# Patient Record
Sex: MALE | Race: BLACK OR AFRICAN AMERICAN | NOT HISPANIC OR LATINO | URBAN - METROPOLITAN AREA
[De-identification: names, ages, dates, MRNs, and addresses within clinical notes are randomized per-mention and may not be internally consistent; named-entity substitution may affect disease eponyms.]

---

## 2023-08-23 ENCOUNTER — HOSPITAL ENCOUNTER (EMERGENCY)
Facility: HOSPITAL | Age: 58
Discharge: HOME | End: 2023-08-23
Attending: STUDENT IN AN ORGANIZED HEALTH CARE EDUCATION/TRAINING PROGRAM
Payer: COMMERCIAL

## 2023-08-23 VITALS
HEART RATE: 80 BPM | OXYGEN SATURATION: 99 % | RESPIRATION RATE: 16 BRPM | DIASTOLIC BLOOD PRESSURE: 87 MMHG | SYSTOLIC BLOOD PRESSURE: 146 MMHG | TEMPERATURE: 99 F | HEIGHT: 70 IN | BODY MASS INDEX: 32.93 KG/M2 | WEIGHT: 230 LBS

## 2023-08-23 DIAGNOSIS — R09.89 CHEST CONGESTION: ICD-10-CM

## 2023-08-23 LAB
CTP QC/QA: YES
INFLUENZA A ANTIGEN, POC: NEGATIVE
INFLUENZA B ANTIGEN, POC: NEGATIVE
SARS-COV-2 RDRP RESP QL NAA+PROBE: NEGATIVE

## 2023-08-23 PROCEDURE — 99283 EMERGENCY DEPT VISIT LOW MDM: CPT | Mod: 25,ER

## 2023-08-23 PROCEDURE — 87635 SARS-COV-2 COVID-19 AMP PRB: CPT | Mod: ER | Performed by: NURSE PRACTITIONER

## 2023-08-23 PROCEDURE — 87804 INFLUENZA ASSAY W/OPTIC: CPT | Mod: ER

## 2023-08-23 RX ORDER — AZITHROMYCIN 250 MG/1
250 TABLET, FILM COATED ORAL DAILY
Qty: 6 TABLET | Refills: 0 | Status: SHIPPED | OUTPATIENT
Start: 2023-08-23 | End: 2023-09-07

## 2023-08-23 NOTE — ED NOTES
Patient presents to ED reports congestion, headache, chills, nasal drainage, productive cough. Patient states symptoms began Monday. Patient states was sent by work to get COVID test. Patient reports taking Robitussin with some relief. Patient denies sick contacts.

## 2023-08-23 NOTE — DISCHARGE INSTRUCTIONS
Thank you for coming to our Emergency Department today. It is important to remember that some problems or medical conditions are difficult to diagnose and may not be found during your Emergency Department visit.     Be sure to follow up with your primary care doctor and review all labs/imaging/tests that were performed during your ER visit with them. Some labs/tests may be outside of the normal range and require non-emergent follow-up and further investigation to help diagnose/exclude/prevent complications or other potentially serious medical conditions that were not addressed during your ER visit.    If you do not have a primary care doctor, you may contact the one listed on your discharge paperwork or you may also call the [facility] Appointment Desk at [phone] to schedule an appointment and establish care with one. It is important to your health that you have a primary care doctor.    Please take all medications as directed. All medications may potentially have side-effects and it is impossible to predict which medications may give you side-effects or what side-effects (if any) they will give you.. If you feel that you are having a negative effect or side-effect of any medication you should immediately stop taking them and seek medical attention. If you feel that you are having a life-threatening reaction call 911.    Return to the ER with any questions/concerns, new/concerning symptoms, worsening or failure to improve.     Do not drive, swim, climb to height, take a bath, operate heavy machinery, drink alcohol or take potentially sedating medications, sign any legal documents or make any important decisions for 24 hours if you have received any pain medications, sedatives or mood altering drugs during your ER visit or within 24 hours of taking them if they have been prescribed to you.     You can find additional resources for Dentists, hearing aids, durable medical equipment, low cost pharmacies and  other resources at https://[facility].org    BELOW THIS LINE ONLY APPLIES IF YOU HAVE A COVID TEST PENDING OR IF YOU HAVE BEEN DIAGNOSED WITH COVID:  Please access [facility] to review the results of your test. Until the results of your COVID test return, you should isolate yourself so as not to potentially spread illness to others.   If your COVID test returns positive, you should isolate yourself so as not to spread illness to others. After five full days, if you are feeling better and you have not had fever for 24 hours, you can return to your typical daily activities, but you must wear a mask around others for an additional 5 days.   If your COVID test returns negative and you are either unvaccinated or more than six months out from your two-dose vaccine and are not yet boosted, you should still quarantine for 5 full days followed by strict mask use for an additional 5 full days.   If your COVID test returns negative and you have received your 2-dose initial vaccine as well as a booster, you should continue strict mask use for 10 full days after the exposure.  For all those exposed, best practice includes a test at day 5 after the exposure. This can be a home test or a test through one of the many testing centers throughout our community.   Masking is always advised to limit the spread of COVID. Cdc.gov is an excellent site to obtain the latest up to date recommendations regarding COVID and COVID testing.     CDC Testing and Quarantine Guidelines for patients with exposure to a known-positive COVID-19 person:  A close exposure is defined as anyone who has had an exposure (masked or unmasked) to a known COVID -19 positive person within 6 feet of someone for a cumulative total of 15 minutes or more over a 24-hour period.   Vaccinated and/or if you recently had a positive covid test within 90 days do NOT need to quarantine after contact with someone who had COVID-19 unless you develop symptoms.   Fully vaccinated  people who have not had a positive test within 90 days, should get tested 3-5 days after their exposure, even if they don't have symptoms and wear a mask indoors in public for 14 days following exposure or until their test result is negative.      Unvaccinated and/or NOT had a positive test within 90 days and meet close exposure  You are required by CDC guidelines to quarantine for at least 5 days from time of exposure followed by 5 days of strict masking. It is recommended, but not required to test after 5 days, unless you develop symptoms, in which case you should test at that time.  If you get tested after 5 days and your test is positive, your 5 day period of isolation starts the day of the positive test.    If your exposure does not meet the above definition, you can return to your normal daily activities to include social distancing, wearing a mask and frequent handwashing.      Here is a link to guidance from the CDC:  https://www.cdc.gov/media/releases/2021/s1227-isolation-quarantine-guidance.html      [place] Dept Of Health Testing Sites:  https://ldh.la.gov/page/3934      [facility] website with testing locations and guidance:  https://www.[facility].org/selfcare

## 2023-08-23 NOTE — Clinical Note
"Ryan"Jesi Rico was seen and treated in our emergency department on 8/23/2023.  He may return to work on 08/28/2023.       If you have any questions or concerns, please don't hesitate to call.      Dayan Razo, DO"

## 2023-08-23 NOTE — ED PROVIDER NOTES
Encounter Date: 8/23/2023    SCRIBE #1 NOTE: I, Devora Vega, am scribing for, and in the presence of,  Dayan Razo DO. I have scribed the following portions of the note - Other sections scribed: HPI; ROS; PE.       History     Chief Complaint   Patient presents with    URI     Pt presents to the ED with headache, cough, runny nose, and congestion onset today.     Ryan Rico is a 58 y.o. male with no known medical Hx who presents to the ED for chief complaint of headache, subjective fever, and chills onset 3 days ago. Associated symptoms are rhinorrhea, cough, congestion, and light-headedness. Patient attempted treatment with Robitussin with no immediate relief. He notes no known sick contact. Patient denies associated chest pain, shortness of breath, nausea, vomiting, diarrhea, dysuria, hematuria, myalgias, otalgia, sore throat, abdominal pain, or rash. No further complaints at this time. Patient notes no loss of consciousness. Patient does not have any medical allergies.       The history is provided by the patient. No  was used.     Review of patient's allergies indicates:  No Known Allergies  No past medical history on file.  No past surgical history on file.  No family history on file.     Review of Systems   Constitutional:  Positive for chills and fatigue. Negative for fever.   HENT:  Positive for congestion and rhinorrhea. Negative for drooling, ear pain, facial swelling, sore throat, trouble swallowing and voice change.    Eyes:  Negative for redness and visual disturbance.   Respiratory:  Positive for cough. Negative for shortness of breath and stridor.    Cardiovascular:  Negative for chest pain.   Gastrointestinal:  Negative for abdominal pain, nausea and vomiting.   Genitourinary:  Negative for dysuria and hematuria.   Musculoskeletal:  Negative for gait problem, myalgias and neck stiffness.   Skin:  Negative for pallor, rash and wound.   Neurological:  Positive for  light-headedness and headaches. Negative for syncope, facial asymmetry, speech difficulty and weakness.   Psychiatric/Behavioral:  Negative for confusion.    All other systems reviewed and are negative.      Physical Exam     Initial Vitals [08/23/23 1057]   BP Pulse Resp Temp SpO2   139/89 88 18 99.1 °F (37.3 °C) 98 %      MAP       --         Physical Exam    Nursing note and vitals reviewed.  Constitutional: Vital signs are normal. He appears well-developed and well-nourished. He is not diaphoretic.  Non-toxic appearance. He does not have a sickly appearance. He does not appear ill.   HENT:   Head: Normocephalic and atraumatic.   Right Ear: External ear and ear canal normal.   Left Ear: External ear and ear canal normal.   Nose: Nose normal.   Mouth/Throat: Uvula is midline, oropharynx is clear and moist and mucous membranes are normal. No oropharyngeal exudate.   Unable to visualize bilateral TM's secondary to cerumen.    Eyes: Conjunctivae and EOM are normal. Pupils are equal, round, and reactive to light. No scleral icterus.   Neck: Neck supple. No JVD present.   Normal range of motion.  Cardiovascular:  Normal rate, regular rhythm, normal heart sounds and intact distal pulses.           Pulmonary/Chest: No accessory muscle usage. No respiratory distress. He has no decreased breath sounds.   Faint course breath sounds in the bilateral bases    Abdominal: Abdomen is soft. He exhibits no distension. There is no abdominal tenderness. There is no rebound and no guarding.   Musculoskeletal:         General: No tenderness or edema. Normal range of motion.      Cervical back: Normal range of motion and neck supple. No rigidity. No spinous process tenderness.     Neurological: He is alert. He has normal strength. No sensory deficit. He displays a negative Romberg sign.   Skin: Skin is warm and dry. No rash noted. No erythema.   Psychiatric: He has a normal mood and affect.         ED Course   Procedures  Labs Reviewed    SARS-COV-2 RDRP GENE    Narrative:     This test utilizes isothermal nucleic acid amplification technology to detect the SARS-CoV-2 RdRp nucleic acid segment. The analytical sensitivity (limit of detection) is 500 copies/swab.     A POSITIVE result is indicative of the presence of SARS-CoV-2 RNA; clinical correlation with patient history and other diagnostic information is necessary to determine patient infection status.    A NEGATIVE result means that SARS-CoV-2 nucleic acids are not present above the limit of detection. A NEGATIVE result should be treated as presumptive. It does not rule out the possibility of COVID-19 and should not be the sole basis for treatment decisions. If COVID-19 is strongly suspected based on clinical and exposure history, re-testing using an alternate molecular assay should be considered.     This test is only for use under the Food and Drug Administration s Emergency Use Authorization (EUA).     Commercial kits are provided by [employer]. Performance characteristics of the EUA have been independently verified by [facility] Department of Pathology and Laboratory Medicine.   _________________________________________________________________   The authorized Fact Sheet for Healthcare Providers and the authorized Fact Sheet for Patients of the ID NOW COVID-19 are available on the FDA website:    https://www.fda.gov/media/252618/download      https://www.fda.gov/media/278167/download      POCT INFLUENZA A/B MOLECULAR   POCT RAPID INFLUENZA A/B          Imaging Results              X-Ray Chest PA And Lateral (Final result)  Result time 08/23/23 12:37:48      Final result by Rian Philip MD (08/23/23 12:37:48)                   Impression:      No radiographic evidence of pneumonia or other source of cough/shortness of breath, noting that early/mild viral pneumonia or nonspecific bronchitis may be radiographically occult.      Electronically signed by: Rian Philip  MD  Date:    08/23/2023  Time:    12:37               Narrative:    EXAMINATION:  XR CHEST PA AND LATERAL    CLINICAL HISTORY:  Other specified symptoms and signs involving the circulatory and respiratory systems    TECHNIQUE:  PA and lateral views of the chest were performed.    COMPARISON:  None    FINDINGS:  Trachea appears relatively midline and patent.Bibasilar linear opacities suggesting minimal platelike scarring versus atelectasis.  The lungs are otherwise symmetrically well expanded without consolidation, pleural effusion or pneumothorax.    The cardiac silhouette is normal in size. The pulmonary vasculature and hilar and mediastinal contours are within normal limits.    Osseous structures show age-related degenerative change with multilevel chronic appearing minimal to mild anterior wedge deformity of a few midthoracic vertebral bodies, without acute process seen.                                       Medications - No data to display  Medical Decision Making   MDM  This is an emergent evaluation of a  58 y.o. male with no known medical Hx who presents to the ED for chief complaint of headache, subjective fever, and chills onset 3 days ago. Initial vitals in the ED  [08/23/23 1057]  BP: 139/89  Pulse: 88  Resp: 18  Temp: 99.1 °F (37.3 °C)  SpO2: 98 % .    Physical exam noted above. DDx includes but is not limited to COVID vs influenza vs other viral illness, pneumonia, migraine vs tension headache. Also considered but clinically less likely to be sepsis, meningitis, PE. dissection. Will obtain labs and imaging including POC COVID, influenza, and chest xray. Will continue to monitor and frequently reassess pending results of labs, treatments and final disposition.    Patient is aware of plan and is amenable.     Dayan Razo D.O  EMERGENCY MEDICINE  12:39 PM 08/23/2023    UPDATE  Labs negative for influenza and COVID.  Chest x-ray also obtained which showed no obvious intrathoracic abnormality.   Given benign exam and workup will discharge given presentation less likely due to a life-threatening cause at this time.  However given history and presentation, will treat with a short course of antibiotics to cover for atypical pneumonia.  Patient also given instructions for symptomatic treatment at home, PCP follow-up in ED return precautions.  Patient aware and agreeable to plan.      This chart was completed using dictation software, as a result there may be some transcription errors     Amount and/or Complexity of Data Reviewed  External Data Reviewed: notes.  Labs: ordered.  Radiology: ordered.    Risk  Prescription drug management.            Scribe Attestation:   Scribe #1: I performed the above scribed service and the documentation accurately describes the services I performed. I attest to the accuracy of the note.              Scribe attestation: I, Dayan Razo DO, personally performed the services described in this documentation.  All medical record entries made by the scribe were at my direction and in my presence.  I have reviewed the chart and agree that the record reflects my personal performance and is accurate and complete.            Clinical Impression:   Final diagnoses:  [R09.89] Chest congestion        ED Disposition Condition    Discharge Stable          ED Prescriptions       Medication Sig Dispense Start Date End Date Auth. Provider    azithromycin (Z-VASHTI) 250 MG tablet Take 1 tablet (250 mg total) by mouth once daily. Take first 2 tablets together, then 1 every day until finished. 6 tablet 8/23/2023 -- Dayan Razo DO          Follow-up Information       Follow up With Specialties Details Why Contact Info    Your primary care physician  Schedule an appointment as soon as possible for a visit in 2 days Emergency Room Follow-up     [facility] ED Emergency Medicine Go to  If symptoms worsen [address] [facility] [address] [phone]     [facility] -  Schedule an appointment as soon as possible for a visit in 2 days Emergency Room Follow-up, to establish with primary care if you do not have a primary care doctor [address] [place] [address]  [phone]               Dayan Razo, DO  08/23/23 1502

## 2023-08-30 ENCOUNTER — OFFICE VISIT (OUTPATIENT)
Dept: FAMILY MEDICINE | Facility: CLINIC | Age: 58
End: 2023-08-30
Payer: COMMERCIAL

## 2023-08-30 VITALS
WEIGHT: 227.5 LBS | HEIGHT: 70 IN | OXYGEN SATURATION: 99 % | BODY MASS INDEX: 32.57 KG/M2 | DIASTOLIC BLOOD PRESSURE: 70 MMHG | HEART RATE: 87 BPM | TEMPERATURE: 99 F | RESPIRATION RATE: 16 BRPM | SYSTOLIC BLOOD PRESSURE: 138 MMHG

## 2023-08-30 DIAGNOSIS — Z76.89 ESTABLISHING CARE WITH NEW DOCTOR, ENCOUNTER FOR: ICD-10-CM

## 2023-08-30 DIAGNOSIS — E55.9 VITAMIN D DEFICIENCY: ICD-10-CM

## 2023-08-30 DIAGNOSIS — J06.9 UPPER RESPIRATORY TRACT INFECTION, UNSPECIFIED TYPE: Primary | ICD-10-CM

## 2023-08-30 DIAGNOSIS — R05.9 COUGH, UNSPECIFIED TYPE: ICD-10-CM

## 2023-08-30 DIAGNOSIS — F17.200 SMOKER: ICD-10-CM

## 2023-08-30 DIAGNOSIS — R06.2 WHEEZING: ICD-10-CM

## 2023-08-30 DIAGNOSIS — Z00.00 ROUTINE GENERAL MEDICAL EXAMINATION AT A HEALTH CARE FACILITY: ICD-10-CM

## 2023-08-30 DIAGNOSIS — E66.09 CLASS 1 OBESITY DUE TO EXCESS CALORIES WITHOUT SERIOUS COMORBIDITY WITH BODY MASS INDEX (BMI) OF 32.0 TO 32.9 IN ADULT: ICD-10-CM

## 2023-08-30 PROCEDURE — 99999 PR PBB SHADOW E&M-EST. PATIENT-LVL IV: CPT | Mod: PBBFAC,,, | Performed by: INTERNAL MEDICINE

## 2023-08-30 PROCEDURE — 3008F PR BODY MASS INDEX (BMI) DOCUMENTED: ICD-10-PCS | Mod: CPTII,S$GLB,, | Performed by: INTERNAL MEDICINE

## 2023-08-30 PROCEDURE — 99204 OFFICE O/P NEW MOD 45 MIN: CPT | Mod: S$GLB,,, | Performed by: INTERNAL MEDICINE

## 2023-08-30 PROCEDURE — 3008F BODY MASS INDEX DOCD: CPT | Mod: CPTII,S$GLB,, | Performed by: INTERNAL MEDICINE

## 2023-08-30 PROCEDURE — 3078F DIAST BP <80 MM HG: CPT | Mod: CPTII,S$GLB,, | Performed by: INTERNAL MEDICINE

## 2023-08-30 PROCEDURE — 3044F PR MOST RECENT HEMOGLOBIN A1C LEVEL <7.0%: ICD-10-PCS | Mod: CPTII,S$GLB,, | Performed by: INTERNAL MEDICINE

## 2023-08-30 PROCEDURE — 99204 PR OFFICE/OUTPT VISIT, NEW, LEVL IV, 45-59 MIN: ICD-10-PCS | Mod: S$GLB,,, | Performed by: INTERNAL MEDICINE

## 2023-08-30 PROCEDURE — 3078F PR MOST RECENT DIASTOLIC BLOOD PRESSURE < 80 MM HG: ICD-10-PCS | Mod: CPTII,S$GLB,, | Performed by: INTERNAL MEDICINE

## 2023-08-30 PROCEDURE — 1159F PR MEDICATION LIST DOCUMENTED IN MEDICAL RECORD: ICD-10-PCS | Mod: CPTII,S$GLB,, | Performed by: INTERNAL MEDICINE

## 2023-08-30 PROCEDURE — 3044F HG A1C LEVEL LT 7.0%: CPT | Mod: CPTII,S$GLB,, | Performed by: INTERNAL MEDICINE

## 2023-08-30 PROCEDURE — 1160F RVW MEDS BY RX/DR IN RCRD: CPT | Mod: CPTII,S$GLB,, | Performed by: INTERNAL MEDICINE

## 2023-08-30 PROCEDURE — 3075F PR MOST RECENT SYSTOLIC BLOOD PRESS GE 130-139MM HG: ICD-10-PCS | Mod: CPTII,S$GLB,, | Performed by: INTERNAL MEDICINE

## 2023-08-30 PROCEDURE — 3075F SYST BP GE 130 - 139MM HG: CPT | Mod: CPTII,S$GLB,, | Performed by: INTERNAL MEDICINE

## 2023-08-30 PROCEDURE — 1159F MED LIST DOCD IN RCRD: CPT | Mod: CPTII,S$GLB,, | Performed by: INTERNAL MEDICINE

## 2023-08-30 PROCEDURE — 1160F PR REVIEW ALL MEDS BY PRESCRIBER/CLIN PHARMACIST DOCUMENTED: ICD-10-PCS | Mod: CPTII,S$GLB,, | Performed by: INTERNAL MEDICINE

## 2023-08-30 PROCEDURE — 99999 PR PBB SHADOW E&M-EST. PATIENT-LVL IV: ICD-10-PCS | Mod: PBBFAC,,, | Performed by: INTERNAL MEDICINE

## 2023-08-30 RX ORDER — PROMETHAZINE HYDROCHLORIDE AND DEXTROMETHORPHAN HYDROBROMIDE 6.25; 15 MG/5ML; MG/5ML
5 SYRUP ORAL 2 TIMES DAILY PRN
Qty: 100 ML | Refills: 0 | Status: SHIPPED | OUTPATIENT
Start: 2023-08-30 | End: 2023-09-07 | Stop reason: SDUPTHER

## 2023-08-30 RX ORDER — ALBUTEROL SULFATE 90 UG/1
2 AEROSOL, METERED RESPIRATORY (INHALATION) EVERY 6 HOURS PRN
Qty: 18 G | Refills: 0 | Status: SHIPPED | OUTPATIENT
Start: 2023-08-30 | End: 2024-08-29

## 2023-08-30 RX ORDER — METHYLPREDNISOLONE 4 MG/1
TABLET ORAL
Qty: 1 EACH | Refills: 0 | Status: SHIPPED | OUTPATIENT
Start: 2023-08-30 | End: 2023-09-07

## 2023-08-30 NOTE — LETTER
August 30, 2023    Ryan Rico  [address] [address]             [facility]  [address] [place] [address]  Phone: [phone]  Fax: [phone] To whom it may concern:    Mr. Rico was seen by me today. He will not be able to go back to work until 09/08/2023 for medical reasons.    If you have any questions or concerns, please don't hesitate to call.    Sincerely,        Xochitl Colby MD

## 2023-08-30 NOTE — LETTER
August 30, 2023    Ryan Rico  [address] [facility] [address]             [facility]  [address] [facility] [address]  Phone: [phone]  Fax: [phone] Dear {MR/MRS/MS/DR:16865} Fields:    ***      If you have any questions or concerns, please don't hesitate to call.    Sincerely,        Xochitl Colby MD

## 2023-08-30 NOTE — PROGRESS NOTES
"Subjective:       Patient ID: Ryan Rico is a pleasant 58 y.o. Black or  male patient    Chief Complaint: Follow-up (ER FOLLOW UP)      Patient is a new pt to me and to our practice..     HPI     Pt with PMH as per list of problems below coming today to establish care with a new PCP and to f-up after an ED visit.  He went to the ED on 08/23/2023 for chest congestion.  As per notes:  "This is an emergent evaluation of a  58 y.o. male with no known medical Hx who presents to the ED for chief complaint of headache, subjective fever, and chills onset 3 days ago. SpO2: 98 % .  Labs negative for influenza and COVID.  Chest x-ray with no obvious intrathoracic abnormality.  Given benign exam and workup will discharge given presentation less likely due to a life-threatening cause at this time.  However given history and presentation, will treat with a short course of antibiotics to cover for atypical pneumonia".  Pt who has not be seen by PCP for a long time.    He reports still having a cough, that is day or night, as well as feeling short-winded, may have wheezing in the evening.    He took the Z-pack as per ED visit.  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    Patient Active Problem List   Diagnosis    Cough    Wheezing    Class 1 obesity due to excess calories without serious comorbidity with body mass index (BMI) of 32.0 to 32.9 in adult    Vitamin D deficiency    Smoker          ACTIVE MEDICAL ISSUES:  Documented in Problem List     PAST MEDICAL HISTORY  Documented     PAST SURGICAL HISTORY:  Documented     SOCIAL HISTORY:  Documented     FAMILY HISTORY:  Documented     ALLERGIES AND MEDICATIONS: updated and reviewed.  Documented    Review of Systems   Constitutional:  Positive for fatigue.   HENT:  Positive for postnasal drip and rhinorrhea.    Respiratory:  Positive for cough, shortness of breath and " "wheezing.    Cardiovascular:  Negative for chest pain, palpitations and leg swelling.   Gastrointestinal: Negative.    Genitourinary: Negative.    Musculoskeletal: Negative.    Psychiatric/Behavioral: Negative.     All other systems reviewed and are negative.      Objective:      Physical Exam  Vitals and nursing note reviewed.   Constitutional:       Appearance: Normal appearance. He is obese.   HENT:      Right Ear: Tympanic membrane normal.      Left Ear: Tympanic membrane normal.   Eyes:      Conjunctiva/sclera: Conjunctivae normal.   Cardiovascular:      Rate and Rhythm: Normal rate and regular rhythm.      Pulses: Normal pulses.      Heart sounds: Normal heart sounds.   Pulmonary:      Effort: Pulmonary effort is normal.      Breath sounds: No stridor. Wheezing present. No rhonchi or rales.   Abdominal:      General: Bowel sounds are normal.   Musculoskeletal:         General: Normal range of motion.   Skin:     General: Skin is warm and dry.   Neurological:      General: No focal deficit present.      Mental Status: He is alert and oriented to person, place, and time.         Vitals:    08/30/23 1406   BP: 138/70   BP Location: Left arm   Patient Position: Sitting   BP Method: Large (Manual)   Pulse: 87   Resp: 16   Temp: 99 °F (37.2 °C)   TempSrc: Oral   SpO2: 99%   Weight: 103.2 kg (227 lb 8.2 oz)   Height: 5' 10" (1.778 m)     Body mass index is 32.65 kg/m².    RESULTS: Reviewed labs from last 12 months    Last Lab Results:     Lab Results   Component Value Date    WBC 3.94 08/31/2023    HGB 13.2 (L) 08/31/2023    HCT 43.0 08/31/2023     08/31/2023     08/31/2023    K 4.3 08/31/2023     08/31/2023    CO2 24 08/31/2023    BUN 16 08/31/2023    CREATININE 1.6 (H) 08/31/2023    CALCIUM 9.1 08/31/2023    ALBUMIN 3.5 08/31/2023    AST 25 08/31/2023    ALT 26 08/31/2023    CHOL 185 08/31/2023    TRIG 77 08/31/2023    HDL 34 (L) 08/31/2023    LDLCALC 135.6 08/31/2023    HGBA1C 5.0 08/31/2023    " TSH 1.909 2023       Assessment:       1. Upper respiratory tract infection, unspecified type    2. Cough, unspecified type    3. Wheezing    4. Establishing care with new doctor, encounter for    5. Class 1 obesity due to excess calories without serious comorbidity with body mass index (BMI) of 32.0 to 32.9 in adult    6. Vitamin D deficiency    7. Smoker    8. Routine general medical examination at a health care facility        Plan:   Ryan was seen today for follow-up.    Diagnoses and all orders for this visit:    Upper respiratory tract infection, unspecified type  -     promethazine-dextromethorphan (PROMETHAZINE-DM) 6.25-15 mg/5 mL Syrp; Take 5 mLs by mouth 2 (two) times daily as needed (cough).  -     methylPREDNISolone (MEDROL DOSEPACK) 4 mg tablet; use as directed  -     albuterol (PROAIR HFA) 90 mcg/actuation inhaler; Inhale 2 puffs into the lungs every 6 (six) hours as needed for Wheezing. Rescue    See HPI and PE. Will treat with a Medrol pack, cough syrup and will provided ProAir. Discussed symptoms and signs of concern. Will give a week of absence of work. Close monitoring.    Cough, unspecified type  -     albuterol (PROAIR HFA) 90 mcg/actuation inhaler; Inhale 2 puffs into the lungs every 6 (six) hours as needed for Wheezing. Rescue    See above.    Wheezing    See above. Advised pt re: smoking.    Establishing care with new doctor, encounter for    Discussed the importance of a good patient-doctor relationship, provided the pt with my contact.    Class 1 obesity due to excess calories without serious comorbidity with body mass index (BMI) of 32.0 to 32.9 in adult    We discussed weight issues and safe, effective ways of losing pounds, includin) diet:  low carbohydrate, low fat diet, stay away from fast food, fried and processed food, use whole grain, lot of fruits and vegetables, use healthy fat such as avocado, nuts and olive oil in reasonable quantity, stay away from sodas. Regular  meals with lean proteins.  2) physical activity: ideally 150 min a week, with cardiovascular and resistance activity.  Patient was encouraged to set realistic attainable goals for weight loss, and we will follow up periodically.    Vitamin D deficiency  -     Vitamin D; Future    Smoker    Heavy smoker, will reassess.     Routine general medical examination at a health care facility  -     CBC Auto Differential; Future  -     Comprehensive Metabolic Panel; Future  -     Hemoglobin A1C; Future  -     TSH; Future  -     Lipid Panel; Future    Blood work and close f-up.    No follow-ups on file.    This note was created by combination of typed  and M-Modal dictation.  Transcription errors may be present.  If there are any questions, please contact me.

## 2023-08-30 NOTE — LETTER
August 30, 2023    Ryan Rico  [address] [facility] [address]             [facility]  [address] [facility] [address]  Phone: [phone]  Fax: [phone] Dear {MR/MRS/MS/DR:85993} Fields:    ***      If you have any questions or concerns, please don't hesitate to call.    Sincerely,        Xochitl Colby MD

## 2023-08-31 ENCOUNTER — LAB VISIT (OUTPATIENT)
Dept: LAB | Facility: HOSPITAL | Age: 58
End: 2023-08-31
Attending: INTERNAL MEDICINE
Payer: COMMERCIAL

## 2023-08-31 DIAGNOSIS — Z00.00 ROUTINE GENERAL MEDICAL EXAMINATION AT A HEALTH CARE FACILITY: ICD-10-CM

## 2023-08-31 DIAGNOSIS — E55.9 VITAMIN D DEFICIENCY: ICD-10-CM

## 2023-08-31 LAB
25(OH)D3+25(OH)D2 SERPL-MCNC: 12 NG/ML (ref 30–96)
ALBUMIN SERPL BCP-MCNC: 3.5 G/DL (ref 3.5–5.2)
ALP SERPL-CCNC: 67 U/L (ref 55–135)
ALT SERPL W/O P-5'-P-CCNC: 26 U/L (ref 10–44)
ANION GAP SERPL CALC-SCNC: 9 MMOL/L (ref 8–16)
AST SERPL-CCNC: 25 U/L (ref 10–40)
BASOPHILS # BLD AUTO: 0.03 K/UL (ref 0–0.2)
BASOPHILS NFR BLD: 0.8 % (ref 0–1.9)
BILIRUB SERPL-MCNC: 0.9 MG/DL (ref 0.1–1)
BUN SERPL-MCNC: 16 MG/DL (ref 6–20)
CALCIUM SERPL-MCNC: 9.1 MG/DL (ref 8.7–10.5)
CHLORIDE SERPL-SCNC: 109 MMOL/L (ref 95–110)
CHOLEST SERPL-MCNC: 185 MG/DL (ref 120–199)
CHOLEST/HDLC SERPL: 5.4 {RATIO} (ref 2–5)
CO2 SERPL-SCNC: 24 MMOL/L (ref 23–29)
CREAT SERPL-MCNC: 1.6 MG/DL (ref 0.5–1.4)
DIFFERENTIAL METHOD: ABNORMAL
EOSINOPHIL # BLD AUTO: 0.1 K/UL (ref 0–0.5)
EOSINOPHIL NFR BLD: 2.3 % (ref 0–8)
ERYTHROCYTE [DISTWIDTH] IN BLOOD BY AUTOMATED COUNT: 12.6 % (ref 11.5–14.5)
EST. GFR  (NO RACE VARIABLE): 49 ML/MIN/1.73 M^2
ESTIMATED AVG GLUCOSE: 97 MG/DL (ref 68–131)
GLUCOSE SERPL-MCNC: 86 MG/DL (ref 70–110)
HBA1C MFR BLD: 5 % (ref 4–5.6)
HCT VFR BLD AUTO: 43 % (ref 40–54)
HDLC SERPL-MCNC: 34 MG/DL (ref 40–75)
HDLC SERPL: 18.4 % (ref 20–50)
HGB BLD-MCNC: 13.2 G/DL (ref 14–18)
IMM GRANULOCYTES # BLD AUTO: 0.02 K/UL (ref 0–0.04)
IMM GRANULOCYTES NFR BLD AUTO: 0.5 % (ref 0–0.5)
LDLC SERPL CALC-MCNC: 135.6 MG/DL (ref 63–159)
LYMPHOCYTES # BLD AUTO: 1.5 K/UL (ref 1–4.8)
LYMPHOCYTES NFR BLD: 39.1 % (ref 18–48)
MCH RBC QN AUTO: 29.9 PG (ref 27–31)
MCHC RBC AUTO-ENTMCNC: 30.7 G/DL (ref 32–36)
MCV RBC AUTO: 97 FL (ref 82–98)
MONOCYTES # BLD AUTO: 0.4 K/UL (ref 0.3–1)
MONOCYTES NFR BLD: 9.6 % (ref 4–15)
NEUTROPHILS # BLD AUTO: 1.9 K/UL (ref 1.8–7.7)
NEUTROPHILS NFR BLD: 47.7 % (ref 38–73)
NONHDLC SERPL-MCNC: 151 MG/DL
NRBC BLD-RTO: 0 /100 WBC
PLATELET # BLD AUTO: 219 K/UL (ref 150–450)
PMV BLD AUTO: 15 FL (ref 9.2–12.9)
POTASSIUM SERPL-SCNC: 4.3 MMOL/L (ref 3.5–5.1)
PROT SERPL-MCNC: 7 G/DL (ref 6–8.4)
RBC # BLD AUTO: 4.42 M/UL (ref 4.6–6.2)
SODIUM SERPL-SCNC: 142 MMOL/L (ref 136–145)
TRIGL SERPL-MCNC: 77 MG/DL (ref 30–150)
TSH SERPL DL<=0.005 MIU/L-ACNC: 1.91 UIU/ML (ref 0.4–4)
WBC # BLD AUTO: 3.94 K/UL (ref 3.9–12.7)

## 2023-08-31 PROCEDURE — 36415 COLL VENOUS BLD VENIPUNCTURE: CPT | Mod: PO | Performed by: INTERNAL MEDICINE

## 2023-08-31 PROCEDURE — 85025 COMPLETE CBC W/AUTO DIFF WBC: CPT | Performed by: INTERNAL MEDICINE

## 2023-08-31 PROCEDURE — 80061 LIPID PANEL: CPT | Performed by: INTERNAL MEDICINE

## 2023-08-31 PROCEDURE — 84443 ASSAY THYROID STIM HORMONE: CPT | Performed by: INTERNAL MEDICINE

## 2023-08-31 PROCEDURE — 83036 HEMOGLOBIN GLYCOSYLATED A1C: CPT | Performed by: INTERNAL MEDICINE

## 2023-08-31 PROCEDURE — 82306 VITAMIN D 25 HYDROXY: CPT | Performed by: INTERNAL MEDICINE

## 2023-08-31 PROCEDURE — 80053 COMPREHEN METABOLIC PANEL: CPT | Performed by: INTERNAL MEDICINE

## 2023-09-02 PROBLEM — E66.811 CLASS 1 OBESITY DUE TO EXCESS CALORIES WITHOUT SERIOUS COMORBIDITY WITH BODY MASS INDEX (BMI) OF 32.0 TO 32.9 IN ADULT: Status: ACTIVE | Noted: 2023-09-02

## 2023-09-02 PROBLEM — R06.2 WHEEZING: Status: ACTIVE | Noted: 2023-09-02

## 2023-09-02 PROBLEM — R05.9 COUGH: Status: ACTIVE | Noted: 2023-09-02

## 2023-09-02 PROBLEM — E55.9 VITAMIN D DEFICIENCY: Status: ACTIVE | Noted: 2023-09-02

## 2023-09-02 PROBLEM — F17.200 SMOKER: Status: ACTIVE | Noted: 2023-09-02

## 2023-09-02 PROBLEM — E66.09 CLASS 1 OBESITY DUE TO EXCESS CALORIES WITHOUT SERIOUS COMORBIDITY WITH BODY MASS INDEX (BMI) OF 32.0 TO 32.9 IN ADULT: Status: ACTIVE | Noted: 2023-09-02

## 2023-09-07 ENCOUNTER — OFFICE VISIT (OUTPATIENT)
Dept: FAMILY MEDICINE | Facility: CLINIC | Age: 58
End: 2023-09-07
Payer: COMMERCIAL

## 2023-09-07 ENCOUNTER — PATIENT OUTREACH (OUTPATIENT)
Dept: ADMINISTRATIVE | Facility: OTHER | Age: 58
End: 2023-09-07
Payer: COMMERCIAL

## 2023-09-07 ENCOUNTER — TELEPHONE (OUTPATIENT)
Dept: FAMILY MEDICINE | Facility: CLINIC | Age: 58
End: 2023-09-07
Payer: COMMERCIAL

## 2023-09-07 VITALS
DIASTOLIC BLOOD PRESSURE: 72 MMHG | HEIGHT: 70 IN | HEART RATE: 76 BPM | WEIGHT: 224.88 LBS | BODY MASS INDEX: 32.19 KG/M2 | SYSTOLIC BLOOD PRESSURE: 128 MMHG | RESPIRATION RATE: 16 BRPM | OXYGEN SATURATION: 99 % | TEMPERATURE: 98 F

## 2023-09-07 DIAGNOSIS — E55.9 VITAMIN D DEFICIENCY: ICD-10-CM

## 2023-09-07 DIAGNOSIS — J06.9 UPPER RESPIRATORY TRACT INFECTION, UNSPECIFIED TYPE: Primary | ICD-10-CM

## 2023-09-07 DIAGNOSIS — N18.31 STAGE 3A CHRONIC KIDNEY DISEASE: ICD-10-CM

## 2023-09-07 DIAGNOSIS — Z23 NEED FOR PNEUMOCOCCAL 20-VALENT CONJUGATE VACCINATION: ICD-10-CM

## 2023-09-07 DIAGNOSIS — E66.09 CLASS 1 OBESITY DUE TO EXCESS CALORIES WITHOUT SERIOUS COMORBIDITY WITH BODY MASS INDEX (BMI) OF 32.0 TO 32.9 IN ADULT: ICD-10-CM

## 2023-09-07 DIAGNOSIS — E78.5 HYPERLIPIDEMIA, UNSPECIFIED HYPERLIPIDEMIA TYPE: ICD-10-CM

## 2023-09-07 DIAGNOSIS — Z23 NEED FOR INFLUENZA VACCINATION: ICD-10-CM

## 2023-09-07 DIAGNOSIS — F17.200 SMOKER: ICD-10-CM

## 2023-09-07 DIAGNOSIS — R06.2 WHEEZING: ICD-10-CM

## 2023-09-07 PROCEDURE — 90471 FLU VACCINE (QUAD) GREATER THAN OR EQUAL TO 3YO PRESERVATIVE FREE IM: ICD-10-PCS | Mod: S$GLB,,, | Performed by: INTERNAL MEDICINE

## 2023-09-07 PROCEDURE — 3044F HG A1C LEVEL LT 7.0%: CPT | Mod: CPTII,S$GLB,, | Performed by: INTERNAL MEDICINE

## 2023-09-07 PROCEDURE — 99214 OFFICE O/P EST MOD 30 MIN: CPT | Mod: 25,S$GLB,, | Performed by: INTERNAL MEDICINE

## 2023-09-07 PROCEDURE — 99214 PR OFFICE/OUTPT VISIT, EST, LEVL IV, 30-39 MIN: ICD-10-PCS | Mod: 25,S$GLB,, | Performed by: INTERNAL MEDICINE

## 2023-09-07 PROCEDURE — 3078F PR MOST RECENT DIASTOLIC BLOOD PRESSURE < 80 MM HG: ICD-10-PCS | Mod: CPTII,S$GLB,, | Performed by: INTERNAL MEDICINE

## 2023-09-07 PROCEDURE — 99999 PR PBB SHADOW E&M-EST. PATIENT-LVL IV: CPT | Mod: PBBFAC,,, | Performed by: INTERNAL MEDICINE

## 2023-09-07 PROCEDURE — 1160F PR REVIEW ALL MEDS BY PRESCRIBER/CLIN PHARMACIST DOCUMENTED: ICD-10-PCS | Mod: CPTII,S$GLB,, | Performed by: INTERNAL MEDICINE

## 2023-09-07 PROCEDURE — 90677 PCV20 VACCINE IM: CPT | Mod: S$GLB,,, | Performed by: INTERNAL MEDICINE

## 2023-09-07 PROCEDURE — 1159F MED LIST DOCD IN RCRD: CPT | Mod: CPTII,S$GLB,, | Performed by: INTERNAL MEDICINE

## 2023-09-07 PROCEDURE — 3074F SYST BP LT 130 MM HG: CPT | Mod: CPTII,S$GLB,, | Performed by: INTERNAL MEDICINE

## 2023-09-07 PROCEDURE — 90472 IMMUNIZATION ADMIN EACH ADD: CPT | Mod: S$GLB,,, | Performed by: INTERNAL MEDICINE

## 2023-09-07 PROCEDURE — 90686 IIV4 VACC NO PRSV 0.5 ML IM: CPT | Mod: S$GLB,,, | Performed by: INTERNAL MEDICINE

## 2023-09-07 PROCEDURE — 90686 FLU VACCINE (QUAD) GREATER THAN OR EQUAL TO 3YO PRESERVATIVE FREE IM: ICD-10-PCS | Mod: S$GLB,,, | Performed by: INTERNAL MEDICINE

## 2023-09-07 PROCEDURE — 90472 PNEUMOCOCCAL CONJUGATE VACCINE 20-VALENT: ICD-10-PCS | Mod: S$GLB,,, | Performed by: INTERNAL MEDICINE

## 2023-09-07 PROCEDURE — 1160F RVW MEDS BY RX/DR IN RCRD: CPT | Mod: CPTII,S$GLB,, | Performed by: INTERNAL MEDICINE

## 2023-09-07 PROCEDURE — 3008F BODY MASS INDEX DOCD: CPT | Mod: CPTII,S$GLB,, | Performed by: INTERNAL MEDICINE

## 2023-09-07 PROCEDURE — 99999 PR PBB SHADOW E&M-EST. PATIENT-LVL IV: ICD-10-PCS | Mod: PBBFAC,,, | Performed by: INTERNAL MEDICINE

## 2023-09-07 PROCEDURE — 1159F PR MEDICATION LIST DOCUMENTED IN MEDICAL RECORD: ICD-10-PCS | Mod: CPTII,S$GLB,, | Performed by: INTERNAL MEDICINE

## 2023-09-07 PROCEDURE — 3008F PR BODY MASS INDEX (BMI) DOCUMENTED: ICD-10-PCS | Mod: CPTII,S$GLB,, | Performed by: INTERNAL MEDICINE

## 2023-09-07 PROCEDURE — 3074F PR MOST RECENT SYSTOLIC BLOOD PRESSURE < 130 MM HG: ICD-10-PCS | Mod: CPTII,S$GLB,, | Performed by: INTERNAL MEDICINE

## 2023-09-07 PROCEDURE — 90677 PNEUMOCOCCAL CONJUGATE VACCINE 20-VALENT: ICD-10-PCS | Mod: S$GLB,,, | Performed by: INTERNAL MEDICINE

## 2023-09-07 PROCEDURE — 3044F PR MOST RECENT HEMOGLOBIN A1C LEVEL <7.0%: ICD-10-PCS | Mod: CPTII,S$GLB,, | Performed by: INTERNAL MEDICINE

## 2023-09-07 PROCEDURE — 3078F DIAST BP <80 MM HG: CPT | Mod: CPTII,S$GLB,, | Performed by: INTERNAL MEDICINE

## 2023-09-07 PROCEDURE — 90471 IMMUNIZATION ADMIN: CPT | Mod: S$GLB,,, | Performed by: INTERNAL MEDICINE

## 2023-09-07 RX ORDER — ERGOCALCIFEROL 1.25 MG/1
50000 CAPSULE ORAL
Qty: 12 CAPSULE | Refills: 0 | Status: SHIPPED | OUTPATIENT
Start: 2023-09-07 | End: 2023-12-06

## 2023-09-07 RX ORDER — PROMETHAZINE HYDROCHLORIDE AND DEXTROMETHORPHAN HYDROBROMIDE 6.25; 15 MG/5ML; MG/5ML
5 SYRUP ORAL 2 TIMES DAILY PRN
Qty: 100 ML | Refills: 0 | Status: SHIPPED | OUTPATIENT
Start: 2023-09-07 | End: 2023-09-17

## 2023-09-07 RX ORDER — ROSUVASTATIN CALCIUM 5 MG/1
5 TABLET, COATED ORAL DAILY
Qty: 90 TABLET | Refills: 3 | Status: SHIPPED | OUTPATIENT
Start: 2023-09-07 | End: 2024-09-06

## 2023-09-07 NOTE — TELEPHONE ENCOUNTER
Pt was seen today, wanting to know when can he return to work; the last letter from 8/30 said to return on 9/8; please advise

## 2023-09-07 NOTE — PROGRESS NOTES
Subjective:       Patient ID: Ryan Rico is a pleasant 58 y.o. Black or  male patient    Chief Complaint: Follow-up      Patient is a pt I saw last on 08/30/2023, see my last notes.    HPI     Pt I saw only once to establish carte and to f-up re: ED visit. See my last notes.  I wanted to see him back soon to reassess the evolution as at last visit, he was still not feeling so good and had wheezing. I gave him an absence of work for one week.  He reports that he feels better, he has been using the medications as directed.  No fever, no cough, no SOB. Almost no more wheezing.       Patient Active Problem List   Diagnosis    Class 1 obesity due to excess calories without serious comorbidity with body mass index (BMI) of 32.0 to 32.9 in adult    Vitamin D deficiency    Smoker          ACTIVE MEDICAL ISSUES:  Documented in Problem List     PAST MEDICAL HISTORY  Documented     PAST SURGICAL HISTORY:  Documented     SOCIAL HISTORY:  Documented     FAMILY HISTORY:  Documented     ALLERGIES AND MEDICATIONS: updated and reviewed.  Documented    Review of Systems   Constitutional:  Positive for fatigue.   HENT:  Negative for postnasal drip and rhinorrhea.    Respiratory:  Positive for wheezing (moderate, rarely). Negative for cough and shortness of breath.    Cardiovascular:  Negative for chest pain, palpitations and leg swelling.   Gastrointestinal: Negative.    Genitourinary: Negative.    Musculoskeletal: Negative.    Psychiatric/Behavioral: Negative.     All other systems reviewed and are negative.      Objective:      Physical Exam  Vitals and nursing note reviewed.   Constitutional:       Appearance: Normal appearance. He is obese.   HENT:      Right Ear: Tympanic membrane normal.      Left Ear: Tympanic membrane normal.   Eyes:      Conjunctiva/sclera: Conjunctivae normal.   Cardiovascular:      Rate and Rhythm: Normal rate and regular rhythm.      Pulses: Normal pulses.      Heart sounds: Normal heart  "sounds.   Pulmonary:      Effort: Pulmonary effort is normal.      Breath sounds: No stridor. No wheezing, rhonchi or rales.   Abdominal:      General: Bowel sounds are normal.   Musculoskeletal:         General: Normal range of motion.   Skin:     General: Skin is warm and dry.   Neurological:      General: No focal deficit present.      Mental Status: He is alert and oriented to person, place, and time.         Vitals:    09/07/23 1302   BP: 128/72   BP Location: Left arm   Patient Position: Sitting   BP Method: Large (Manual)   Pulse: 76   Resp: 16   Temp: 98.4 °F (36.9 °C)   TempSrc: Oral   SpO2: 99%   Weight: 102 kg (224 lb 13.9 oz)   Height: 5' 10" (1.778 m)     Body mass index is 32.27 kg/m².    RESULTS: Reviewed labs from last 12 months    Last Lab Results:     Lab Results   Component Value Date    WBC 3.94 08/31/2023    HGB 13.2 (L) 08/31/2023    HCT 43.0 08/31/2023     08/31/2023     08/31/2023    K 4.3 08/31/2023     08/31/2023    CO2 24 08/31/2023    BUN 16 08/31/2023    CREATININE 1.6 (H) 08/31/2023    CALCIUM 9.1 08/31/2023    ALBUMIN 3.5 08/31/2023    AST 25 08/31/2023    ALT 26 08/31/2023    CHOL 185 08/31/2023    TRIG 77 08/31/2023    HDL 34 (L) 08/31/2023    LDLCALC 135.6 08/31/2023    HGBA1C 5.0 08/31/2023    TSH 1.909 08/31/2023     The 10-year ASCVD risk score (Jazmine HANSON, et al., 2019) is: 15%    Values used to calculate the score:      Age: 58 years      Sex: Male      Is Non- : Yes      Diabetic: No      Tobacco smoker: Yes      Systolic Blood Pressure: 128 mmHg      Is BP treated: No      HDL Cholesterol: 34 mg/dL      Total Cholesterol: 185 mg/dL     Assessment:       1. Upper respiratory tract infection, unspecified type    2. Wheezing    3. Class 1 obesity due to excess calories without serious comorbidity with body mass index (BMI) of 32.0 to 32.9 in adult    4. Hyperlipidemia, unspecified hyperlipidemia type    5. Stage 3a chronic kidney disease "    6. Vitamin D deficiency    7. Smoker    8. Need for influenza vaccination    9. Need for pneumococcal 20-valent conjugate vaccination        Plan:   Ryan was seen today for follow-up.    Diagnoses and all orders for this visit:    Upper respiratory tract infection, unspecified type  -     promethazine-dextromethorphan (PROMETHAZINE-DM) 6.25-15 mg/5 mL Syrp; Take 5 mLs by mouth 2 (two) times daily as needed (cough).    Recovering well, may still cough a bit at night, will refill cough syrup. Cleared to go back to work.    Wheezing    Solved, see HPI and last notes.    Class 1 obesity due to excess calories without serious comorbidity with body mass index (BMI) of 32.0 to 32.9 in adult    We discussed weight issues and safe, effective ways of losing pounds, includin) diet:  low carbohydrate, low fat diet, stay away from fast food, fried and processed food (difficult for him as ), use whole grain, lot of fruits and vegetables, use healthy fat such as avocado, nuts and olive oil in reasonable quantity, stay away from sodas. Regular meals with lean proteins.  2) physical activity: ideally 150 min a week, with cardiovascular and resistance activity.  Patient was encouraged to set realistic attainable goals for weight loss, and we will follow up periodically.    Hyperlipidemia, unspecified hyperlipidemia type  -     rosuvastatin (CRESTOR) 5 MG tablet; Take 1 tablet (5 mg total) by mouth once daily. Take in the evening    Based on the lifestyle and the ASCVD risk score, will start him on Crestor low dose. Discussed again lifestyle changes.    Stage 3a chronic kidney disease    Will monitor.    Vitamin D deficiency  -     ergocalciferol (ERGOCALCIFEROL) 50,000 unit Cap; Take 1 capsule (50,000 Units total) by mouth every 7 days.    Will substitute.    Smoker    Advised to quit smoking.    Need for influenza vaccination  -     Influenza - Quadrivalent *Preferred* (6 months+) (PF)    Need for pneumococcal  20-valent conjugate vaccination  -     (In Office Administered) Pneumococcal Conjugate Vaccine (20 Valent) (IM)      Follow up in about 4 weeks (around 10/5/2023) for f-up.    This note was created by combination of typed  and M-Modal dictation.  Transcription errors may be present.  If there are any questions, please contact me.

## 2023-09-07 NOTE — PROGRESS NOTES
CHW - Initial Contact    This Community Health Worker completed the Social Determinant of Health questionnaire with patient via telephone today.    Mr Rico has some concern for his social health but nothing detrimental at the moment.  Follow up required: Y  Follow-up Outreach - Due: 9/21/2023

## 2023-09-07 NOTE — TELEPHONE ENCOUNTER
----- Message from Chelita Ellsworth sent at 9/7/2023  3:57 PM CDT -----  Regarding: self .[phone]  .Type: Patient Call Back    Who called self     What is the request in detail: Pt is requesting to speak with the nurse in regards to his return to work status     Can the clinic reply by [facility]? Call back     Would the patient rather a call back or a response via [facility]?  Call back     Best call back number: ..[phone]

## 2023-09-07 NOTE — PROGRESS NOTES
Health Maintenance Due   Topic     Hepatitis C Screening      Pneumococcal Vaccines (Age 0-64) (1 - PCV)     HIV Screening      TETANUS VACCINE      Colorectal Cancer Screening      Shingles Vaccine (1 of 2)     COVID-19 Vaccine (2 - Pfizer series)     Influenza Vaccine (1)

## 2023-09-08 PROBLEM — R06.2 WHEEZING: Status: RESOLVED | Noted: 2023-09-02 | Resolved: 2023-09-08

## 2023-09-08 PROBLEM — R05.9 COUGH: Status: RESOLVED | Noted: 2023-09-02 | Resolved: 2023-09-08

## 2023-09-20 ENCOUNTER — PATIENT OUTREACH (OUTPATIENT)
Dept: ADMINISTRATIVE | Facility: OTHER | Age: 58
End: 2023-09-20
Payer: COMMERCIAL

## 2023-09-20 NOTE — PROGRESS NOTES
CHW - Case Closure    This Community Health Worker spoke to patient via telephone today.   Pt/Caregiver denied any additional needs at this time and agrees with episode closure at this time.  Provided patient with Community Health Worker's contact information and encouraged him/her to contact this Community Health Worker if additional needs arise.

## 2024-01-02 ENCOUNTER — OFFICE VISIT (OUTPATIENT)
Dept: FAMILY MEDICINE | Facility: CLINIC | Age: 59
End: 2024-01-02
Payer: COMMERCIAL

## 2024-01-02 ENCOUNTER — TELEPHONE (OUTPATIENT)
Dept: FAMILY MEDICINE | Facility: CLINIC | Age: 59
End: 2024-01-02
Payer: COMMERCIAL

## 2024-01-02 VITALS
BODY MASS INDEX: 33.23 KG/M2 | DIASTOLIC BLOOD PRESSURE: 80 MMHG | WEIGHT: 232.13 LBS | RESPIRATION RATE: 16 BRPM | TEMPERATURE: 98 F | HEART RATE: 83 BPM | OXYGEN SATURATION: 97 % | SYSTOLIC BLOOD PRESSURE: 130 MMHG | HEIGHT: 70 IN

## 2024-01-02 DIAGNOSIS — N18.31 STAGE 3A CHRONIC KIDNEY DISEASE: ICD-10-CM

## 2024-01-02 DIAGNOSIS — R05.1 ACUTE COUGH: Primary | ICD-10-CM

## 2024-01-02 PROCEDURE — 1159F MED LIST DOCD IN RCRD: CPT | Mod: CPTII,S$GLB,, | Performed by: INTERNAL MEDICINE

## 2024-01-02 PROCEDURE — 3075F SYST BP GE 130 - 139MM HG: CPT | Mod: CPTII,S$GLB,, | Performed by: INTERNAL MEDICINE

## 2024-01-02 PROCEDURE — 99214 OFFICE O/P EST MOD 30 MIN: CPT | Mod: S$GLB,,, | Performed by: INTERNAL MEDICINE

## 2024-01-02 PROCEDURE — 3008F BODY MASS INDEX DOCD: CPT | Mod: CPTII,S$GLB,, | Performed by: INTERNAL MEDICINE

## 2024-01-02 PROCEDURE — 3079F DIAST BP 80-89 MM HG: CPT | Mod: CPTII,S$GLB,, | Performed by: INTERNAL MEDICINE

## 2024-01-02 PROCEDURE — 99999 PR PBB SHADOW E&M-EST. PATIENT-LVL IV: CPT | Mod: PBBFAC,,, | Performed by: INTERNAL MEDICINE

## 2024-01-02 PROCEDURE — 1160F RVW MEDS BY RX/DR IN RCRD: CPT | Mod: CPTII,S$GLB,, | Performed by: INTERNAL MEDICINE

## 2024-01-02 RX ORDER — PROMETHAZINE HYDROCHLORIDE AND DEXTROMETHORPHAN HYDROBROMIDE 6.25; 15 MG/5ML; MG/5ML
5 SYRUP ORAL EVERY 12 HOURS PRN
Qty: 100 ML | Refills: 0 | Status: SHIPPED | OUTPATIENT
Start: 2024-01-02 | End: 2024-01-12

## 2024-01-02 RX ORDER — METHYLPREDNISOLONE 4 MG/1
TABLET ORAL
Qty: 1 EACH | Refills: 0 | Status: SHIPPED | OUTPATIENT
Start: 2024-01-02

## 2024-01-02 NOTE — PROGRESS NOTES
"Subjective:       Patient ID: Ryan Rico is a pleasant 58 y.o. Black or  male patient    Chief Complaint: Cough (With mild chest pain upon coughing )      Patient is a pt I saw last on 09/07/2023, see my last notes.     HPI     With past medical history as per list of problems below coming today due to new issue of cough.    He traveled to [place] as he is a , came back yesterday.  Stayed there for 5 hours only and then drove back (they were two due to distance and duration of the trip).  He reports that yesterday evening he started to cough, it has a mild cough, dry,  it did not last for the whole night, he did not take any medication for this.    He has no fever, chest pain, shortness of breath, chills or GI symptoms.    He has no more symptoms at all this morning.  He plans to do a "lung cleanse" (OTC?).    Patient Active Problem List   Diagnosis    Class 1 obesity due to excess calories without serious comorbidity with body mass index (BMI) of 32.0 to 32.9 in adult    Vitamin D deficiency    Smoker    Stage 3a chronic kidney disease          ACTIVE MEDICAL ISSUES:  Documented in Problem List     PAST MEDICAL HISTORY  Documented     PAST SURGICAL HISTORY:  Documented     SOCIAL HISTORY:  Documented     FAMILY HISTORY:  Documented     ALLERGIES AND MEDICATIONS: updated and reviewed.  Documented    Review of Systems   Constitutional:  Negative for fatigue and fever.   HENT: Negative.     Eyes: Negative.    Respiratory:  Positive for cough (solved).    Cardiovascular: Negative.    Gastrointestinal: Negative.    All other systems reviewed and are negative.      Objective:      Physical Exam  Vitals and nursing note reviewed.   Constitutional:       Appearance: Normal appearance. He is obese.   HENT:      Right Ear: Tympanic membrane and external ear normal.      Left Ear: Tympanic membrane and external ear normal.      Mouth/Throat:      Pharynx: No posterior oropharyngeal erythema. " "  Eyes:      Conjunctiva/sclera: Conjunctivae normal.   Cardiovascular:      Rate and Rhythm: Normal rate and regular rhythm.      Pulses: Normal pulses.      Heart sounds: Normal heart sounds.   Pulmonary:      Effort: Pulmonary effort is normal.      Breath sounds: No stridor. No wheezing, rhonchi or rales.   Abdominal:      General: Bowel sounds are normal.   Musculoskeletal:         General: Normal range of motion.   Skin:     General: Skin is warm and dry.   Neurological:      General: No focal deficit present.      Mental Status: He is alert and oriented to person, place, and time.         Vitals:    01/02/24 0949 01/02/24 1016   BP: (!) 140/90 130/80   BP Location: Left arm    Patient Position: Sitting    BP Method: Large (Manual)    Pulse: 83    Resp: 16    Temp: 98 °F (36.7 °C)    TempSrc: Oral    SpO2: 97%    Weight: 105.3 kg (232 lb 2.3 oz)    Height: 5' 10" (1.778 m)      Body mass index is 33.31 kg/m².    RESULTS: Reviewed labs from last 12 months    Assessment:       1. Acute cough    2. Stage 3a chronic kidney disease        Plan:   Ryan was seen today for cough.    Diagnoses and all orders for this visit:    Acute cough  -     methylPREDNISolone (MEDROL DOSEPACK) 4 mg tablet; use as directed  -     promethazine-dextromethorphan (PROMETHAZINE-DM) 6.25-15 mg/5 mL Syrp; Take 5 mLs by mouth every 12 (twelve) hours as needed (cough).    See HPI. I have no indication for testing re: COVID, flu and RSV. No cough today and PE normal.  I will give him a Medrol pack out of safety and cough syrup, advised re: symptoms and signs to monitor. Absence of work for one week due to him being a  supposed to drive to [place] tomorrow.  Will see him back for return to work as per his request.    Stage 3a chronic kidney disease    Will monitor.    No follow-ups on file.    This note was created by combination of typed  and M-Modal dictation.  Transcription errors may be present.  If there are any " questions, please contact me.

## 2024-01-02 NOTE — LETTER
[address]  ? [place], [address] ? Phone [phone] ? Fax [phone]           Return to Work    Patient: Ryan Rico  YOB: 1965   Date: 01/02/2024      To Whom It May Concern:     Ryan Rico was seen in my office on 01/02/2024.  He will be out of work for medical reasons until 01/08/2023 with return to work with no restrictions on 01/09/2023.       If you have any questions or concerns, or if I can be of further assistance, please do not hesitate to contact me.     Sincerely,    Xochitl Colby MD

## 2024-01-02 NOTE — TELEPHONE ENCOUNTER
Spoke with pt and let them know that the prescription was just sent to the pharmacy    ----- Message from Gabbie Meza sent at 1/2/2024 11:29 AM CST -----  Regarding: patient call back  Type: Patient Call Back    Who called: self     What is the request in detail: Said that he just left and the pharmacy and they do not have his medicine.     Can the clinic reply by [facility]? No     Would the patient rather a call back or a response via [facility]? Call     Best call back number: .[phone]

## 2024-01-06 NOTE — PROGRESS NOTES
"Subjective:       Patient ID: Ryan Rico is a pleasant 58 y.o. Black or  male patient    Chief Complaint: Follow-up      Patient is a pt I saw last on 01/02/2024, see my last notes.     HPI     Pt with PMH as per list of problems below coming today for a close f-up visit.  See my last notes.  At last visit, due to cough and his straining job ( who had to go to [place] the next day). I gave him an absence of work until 01/09/2024.   No indication for testing for flu/COVID/RSV.  Provided steroids and cough syrup to be on the safe side.  He wanted to be seen today for a close f-up visit to be cleared to go back to work.  He reports that he feels better. Never filled in the medications I sent as states he was told they were not at the pharmacy?  Took a "lung detox herb" that he states helped.      Patient Active Problem List   Diagnosis    Class 1 obesity due to excess calories without serious comorbidity with body mass index (BMI) of 32.0 to 32.9 in adult    Vitamin D deficiency    Smoker    Stage 3a chronic kidney disease          ACTIVE MEDICAL ISSUES:  Documented in Problem List     PAST MEDICAL HISTORY  Documented     PAST SURGICAL HISTORY:  Documented     SOCIAL HISTORY:  Documented     FAMILY HISTORY:  Documented     ALLERGIES AND MEDICATIONS: updated and reviewed.  Documented    Review of Systems   Constitutional:  Negative for fatigue and fever.   HENT: Negative.     Eyes: Negative.    Respiratory:  Negative for cough.    Cardiovascular: Negative.    Gastrointestinal: Negative.    All other systems reviewed and are negative.      Objective:      Physical Exam  Vitals and nursing note reviewed.   Constitutional:       Appearance: Normal appearance. He is obese.   HENT:      Right Ear: Tympanic membrane and external ear normal.      Left Ear: Tympanic membrane and external ear normal.      Mouth/Throat:      Pharynx: No posterior oropharyngeal erythema.   Eyes:      Conjunctiva/sclera: " "Conjunctivae normal.   Cardiovascular:      Rate and Rhythm: Normal rate and regular rhythm.      Pulses: Normal pulses.      Heart sounds: Normal heart sounds.   Pulmonary:      Effort: Pulmonary effort is normal.      Breath sounds: No stridor. No wheezing, rhonchi or rales.   Abdominal:      General: Bowel sounds are normal.   Musculoskeletal:         General: Normal range of motion.   Skin:     General: Skin is warm and dry.   Neurological:      General: No focal deficit present.      Mental Status: He is alert and oriented to person, place, and time.         Vitals:    01/08/24 1059   BP: 132/80   BP Location: Left arm   Patient Position: Sitting   BP Method: Large (Manual)   Pulse: 83   Resp: 16   Temp: 97.9 °F (36.6 °C)   TempSrc: Oral   SpO2: 97%   Weight: 103.3 kg (227 lb 11.8 oz)   Height: 5' 10" (1.778 m)     Body mass index is 32.68 kg/m².    RESULTS: Reviewed labs from last 12 months    Last Lab Results:     Lab Results   Component Value Date    WBC 3.94 08/31/2023    HGB 13.2 (L) 08/31/2023    HCT 43.0 08/31/2023     08/31/2023     08/31/2023    K 4.3 08/31/2023     08/31/2023    CO2 24 08/31/2023    BUN 16 08/31/2023    CREATININE 1.6 (H) 08/31/2023    CALCIUM 9.1 08/31/2023    ALBUMIN 3.5 08/31/2023    AST 25 08/31/2023    ALT 26 08/31/2023    CHOL 185 08/31/2023    TRIG 77 08/31/2023    HDL 34 (L) 08/31/2023    LDLCALC 135.6 08/31/2023    HGBA1C 5.0 08/31/2023    TSH 1.909 08/31/2023       Assessment:       1. Cough, unspecified type    2. Class 1 obesity due to excess calories without serious comorbidity with body mass index (BMI) of 32.0 to 32.9 in adult        Plan:   Ryan was seen today for follow-up.    Diagnoses and all orders for this visit:    Cough, unspecified type    See HPI. Solved. No other symptoms. Pt cleared to go back to work with no restrictions.    Class 1 obesity due to excess calories without serious comorbidity with body mass index (BMI) of 32.0 to 32.9 in " adult    Will discuss at each visit.    No follow-ups on file.    This note was created by combination of typed  and M-Modal dictation.  Transcription errors may be present.  If there are any questions, please contact me.

## 2024-01-08 ENCOUNTER — OFFICE VISIT (OUTPATIENT)
Dept: FAMILY MEDICINE | Facility: CLINIC | Age: 59
End: 2024-01-08
Payer: COMMERCIAL

## 2024-01-08 VITALS
DIASTOLIC BLOOD PRESSURE: 80 MMHG | OXYGEN SATURATION: 97 % | SYSTOLIC BLOOD PRESSURE: 132 MMHG | BODY MASS INDEX: 32.61 KG/M2 | HEIGHT: 70 IN | RESPIRATION RATE: 16 BRPM | WEIGHT: 227.75 LBS | HEART RATE: 83 BPM | TEMPERATURE: 98 F

## 2024-01-08 DIAGNOSIS — R05.9 COUGH, UNSPECIFIED TYPE: Primary | ICD-10-CM

## 2024-01-08 DIAGNOSIS — E66.09 CLASS 1 OBESITY DUE TO EXCESS CALORIES WITHOUT SERIOUS COMORBIDITY WITH BODY MASS INDEX (BMI) OF 32.0 TO 32.9 IN ADULT: ICD-10-CM

## 2024-01-08 PROCEDURE — 3075F SYST BP GE 130 - 139MM HG: CPT | Mod: CPTII,S$GLB,, | Performed by: INTERNAL MEDICINE

## 2024-01-08 PROCEDURE — 1159F MED LIST DOCD IN RCRD: CPT | Mod: CPTII,S$GLB,, | Performed by: INTERNAL MEDICINE

## 2024-01-08 PROCEDURE — 99999 PR PBB SHADOW E&M-EST. PATIENT-LVL IV: CPT | Mod: PBBFAC,,, | Performed by: INTERNAL MEDICINE

## 2024-01-08 PROCEDURE — 1160F RVW MEDS BY RX/DR IN RCRD: CPT | Mod: CPTII,S$GLB,, | Performed by: INTERNAL MEDICINE

## 2024-01-08 PROCEDURE — 3008F BODY MASS INDEX DOCD: CPT | Mod: CPTII,S$GLB,, | Performed by: INTERNAL MEDICINE

## 2024-01-08 PROCEDURE — 99214 OFFICE O/P EST MOD 30 MIN: CPT | Mod: S$GLB,,, | Performed by: INTERNAL MEDICINE

## 2024-01-08 PROCEDURE — 3079F DIAST BP 80-89 MM HG: CPT | Mod: CPTII,S$GLB,, | Performed by: INTERNAL MEDICINE

## 2024-01-08 NOTE — LETTER
[address] ? [place], [address] ? Phone [phone] ? Fax [phone]           Return to Work    Patient: Ryan Rico  YOB: 1965   Date: 01/08/2024      To Whom It May Concern:     Ryan Rico was seen in my office on 01/08/2024. Pt has been out of work from 01/01/2024, and will be able to return with no limitations on 01/09/2024.    If you have any questions or concerns, or if I can be of further assistance, please do not hesitate to contact me.     Sincerely,    Xochitl Colby MD

## 2024-01-08 NOTE — PROGRESS NOTES
Health Maintenance Due   Topic     Hepatitis C Screening      HIV Screening      TETANUS VACCINE      Colorectal Cancer Screening      Shingles Vaccine (1 of 2)     RSV Vaccine (Age 60+ and Pregnant patients) (1 - 1-dose 60+ series)     COVID-19 Vaccine (2 - 2023-24 season)

## 2024-02-06 DIAGNOSIS — Z12.11 COLON CANCER SCREENING: ICD-10-CM

## 2024-03-21 ENCOUNTER — PATIENT OUTREACH (OUTPATIENT)
Dept: ADMINISTRATIVE | Facility: HOSPITAL | Age: 59
End: 2024-03-21
Payer: COMMERCIAL

## 2024-03-21 NOTE — PROGRESS NOTES
Population Health Chart Review & Patient Outreach Details      Additional [facility] Health Notes:    Due for overdue HM below          Colon Cancer Screening   --need to mail back fitkit.     -- need to get recent records if already done. If not done, orders/referrals need to be place and schedule. Please forward messages to me.     Left message for patient to return call.           Updates Requested / Reviewed:      Updated Care Coordination Note, Care Everywhere, and Care Team Updated         Health Maintenance Topics Overdue:      VB Score: 1     Colon Cancer Screening    Tetanus Vaccine  Shingles/Zoster Vaccine  RSV Vaccine                  Health Maintenance Topic(s) Outreach Outcomes & Actions Taken:    Colorectal Cancer Screening - Outreach Outcomes & Actions Taken  : Left message for patient to return call.

## 2024-07-12 ENCOUNTER — OFFICE VISIT (OUTPATIENT)
Dept: FAMILY MEDICINE | Facility: CLINIC | Age: 59
End: 2024-07-12
Payer: COMMERCIAL

## 2024-07-12 VITALS
HEIGHT: 70 IN | WEIGHT: 229.25 LBS | HEART RATE: 91 BPM | TEMPERATURE: 100 F | SYSTOLIC BLOOD PRESSURE: 112 MMHG | RESPIRATION RATE: 16 BRPM | DIASTOLIC BLOOD PRESSURE: 74 MMHG | BODY MASS INDEX: 32.82 KG/M2 | OXYGEN SATURATION: 95 %

## 2024-07-12 DIAGNOSIS — R05.1 ACUTE COUGH: ICD-10-CM

## 2024-07-12 DIAGNOSIS — Z20.822 SUSPECTED COVID-19 VIRUS INFECTION: Primary | ICD-10-CM

## 2024-07-12 DIAGNOSIS — R52 BODY ACHES: ICD-10-CM

## 2024-07-12 DIAGNOSIS — Z12.5 ENCOUNTER FOR SCREENING FOR MALIGNANT NEOPLASM OF PROSTATE: ICD-10-CM

## 2024-07-12 DIAGNOSIS — F17.200 SMOKER: ICD-10-CM

## 2024-07-12 DIAGNOSIS — Z00.00 ANNUAL PHYSICAL EXAM: ICD-10-CM

## 2024-07-12 DIAGNOSIS — R09.81 NASAL CONGESTION: ICD-10-CM

## 2024-07-12 PROCEDURE — 3074F SYST BP LT 130 MM HG: CPT | Mod: CPTII,S$GLB,, | Performed by: INTERNAL MEDICINE

## 2024-07-12 PROCEDURE — 0241U SARS-COV2 (COVID) WITH FLU/RSV BY PCR: CPT | Performed by: INTERNAL MEDICINE

## 2024-07-12 PROCEDURE — 3078F DIAST BP <80 MM HG: CPT | Mod: CPTII,S$GLB,, | Performed by: INTERNAL MEDICINE

## 2024-07-12 PROCEDURE — 1160F RVW MEDS BY RX/DR IN RCRD: CPT | Mod: CPTII,S$GLB,, | Performed by: INTERNAL MEDICINE

## 2024-07-12 PROCEDURE — 99999 PR PBB SHADOW E&M-EST. PATIENT-LVL IV: CPT | Mod: PBBFAC,,, | Performed by: INTERNAL MEDICINE

## 2024-07-12 PROCEDURE — 1159F MED LIST DOCD IN RCRD: CPT | Mod: CPTII,S$GLB,, | Performed by: INTERNAL MEDICINE

## 2024-07-12 PROCEDURE — 3008F BODY MASS INDEX DOCD: CPT | Mod: CPTII,S$GLB,, | Performed by: INTERNAL MEDICINE

## 2024-07-12 PROCEDURE — 99214 OFFICE O/P EST MOD 30 MIN: CPT | Mod: S$GLB,,, | Performed by: INTERNAL MEDICINE

## 2024-07-12 RX ORDER — PROMETHAZINE HYDROCHLORIDE AND DEXTROMETHORPHAN HYDROBROMIDE 6.25; 15 MG/5ML; MG/5ML
5 SYRUP ORAL EVERY 12 HOURS PRN
Qty: 100 ML | Refills: 0 | Status: SHIPPED | OUTPATIENT
Start: 2024-07-12 | End: 2024-07-22

## 2024-07-12 RX ORDER — METHYLPREDNISOLONE 4 MG/1
TABLET ORAL
Qty: 1 EACH | Refills: 0 | Status: SHIPPED | OUTPATIENT
Start: 2024-07-12

## 2024-07-12 NOTE — PROGRESS NOTES
Subjective:       Patient ID: Ryan Rico is a pleasant 59 y.o. Black or  male patient    Chief Complaint: Nasal Congestion (X3 days), Generalized Body Aches (X3 days), and Cough (X3 days)      Patient is a patient I saw last on August 30, 2023, see my last notes.    HPI:      Patient with past medical history as per list of problems below coming today due to issue of nasal congestion, body aches and cough for 3 months.  It is not the 1st episode, seen several times for same reason in the past.  Also a component of exposure to strong acid at work, wears PPE  He is also a smoker, one pack a day.  He has not be going to work from the beginning of the symptoms.  Denies chest pain, headache, fever, no wheezing.      Patient Active Problem List   Diagnosis    Wheezing    Class 1 obesity due to excess calories without serious comorbidity with body mass index (BMI) of 32.0 to 32.9 in adult    Vitamin D deficiency    Smoker    Stage 3a chronic kidney disease    Encounter for colorectal cancer screening    Cough    Upper respiratory tract infection         PAST MEDICAL HISTORY  Past Medical History:   Diagnosis Date    Wheezing 9/2/2023        PAST SURGICAL HISTORY:  Past Surgical History:   Procedure Laterality Date    orchectom      RADICAL NEPHRECTOMY Right         SOCIAL HISTORY:   reports that he has been smoking cigarettes. He has never used smokeless tobacco. He reports current alcohol use.     FAMILY HISTORY:  Family History   Problem Relation Name Age of Onset    Lung cancer Mother          ALLERGIES:   Review of patient's allergies indicates:  No Known Allergies    MEDICATIONS:    Current Outpatient Medications:     albuterol (PROAIR HFA) 90 mcg/actuation inhaler, Inhale 2 puffs into the lungs every 6 (six) hours as needed for Wheezing. Rescue, Disp: 18 g, Rfl: 0    methylPREDNISolone (MEDROL DOSEPACK) 4 mg tablet, use as directed, Disp: 1 each, Rfl: 0    promethazine-dextromethorphan  "(PROMETHAZINE-DM) 6.25-15 mg/5 mL Syrp, Take 5 mLs by mouth every 12 (twelve) hours as needed (cough)., Disp: 100 mL, Rfl: 0    Review of Systems   Constitutional:  Negative for fatigue and fever.        Body aches   HENT:  Positive for postnasal drip and rhinorrhea.    Eyes: Negative.    Respiratory:  Negative for cough, shortness of breath and wheezing.    Cardiovascular: Negative.    Gastrointestinal: Negative.    Musculoskeletal: Negative.    All other systems reviewed and are negative.      Objective:      Physical Exam  Vitals reviewed.   Constitutional:       Appearance: Normal appearance. He is obese.   HENT:      Right Ear: Tympanic membrane and external ear normal.      Left Ear: Tympanic membrane and external ear normal.      Mouth/Throat:      Pharynx: No posterior oropharyngeal erythema.   Eyes:      Conjunctiva/sclera: Conjunctivae normal.   Cardiovascular:      Rate and Rhythm: Normal rate and regular rhythm.      Pulses: Normal pulses.      Heart sounds: Normal heart sounds.   Pulmonary:      Effort: Pulmonary effort is normal.      Breath sounds: No stridor. No wheezing, rhonchi or rales.   Abdominal:      General: Bowel sounds are normal.   Musculoskeletal:         General: Normal range of motion.   Skin:     General: Skin is warm and dry.   Neurological:      General: No focal deficit present.      Mental Status: He is alert and oriented to person, place, and time.         Vitals:    07/12/24 1032   BP: 112/74   BP Location: Left arm   Patient Position: Sitting   BP Method: Large (Manual)   Pulse: 91   Resp: 16   Temp: 99.5 °F (37.5 °C)   TempSrc: Oral   SpO2: 95%   Weight: 104 kg (229 lb 4.5 oz)   Height: 5' 10" (1.778 m)     Body mass index is 32.9 kg/m².    RESULTS: Reviewed labs from last 12 months      Last Lab Results:     Lab Results   Component Value Date    WBC 3.94 08/31/2023    HGB 13.2 (L) 08/31/2023    HCT 43.0 08/31/2023     08/31/2023     08/31/2023    K 4.3 08/31/2023 "     08/31/2023    CO2 24 08/31/2023    BUN 16 08/31/2023    CREATININE 1.6 (H) 08/31/2023    CALCIUM 9.1 08/31/2023    ALBUMIN 3.5 08/31/2023    AST 25 08/31/2023    ALT 26 08/31/2023    CHOL 185 08/31/2023    TRIG 77 08/31/2023    HDL 34 (L) 08/31/2023    LDLCALC 135.6 08/31/2023    HGBA1C 5.0 08/31/2023    TSH 1.909 08/31/2023         Assessment & Plan:       1. Suspected COVID-19 virus infection  -     SARS-Cov2 (COVID) with FLU/RSV by PCR    See HPI, PE with mild findings, cannot rule out COVID or flu, will swab, gave symptomatic treatment, advised regarding quarantine until results of the COVID test.  Discussed the importance to drink plenty of fluid.  Symptoms monitoring.  Absence of work from the start of the symptoms, may go back if COVID negative and feeling better.    2. Acute cough  -     SARS-Cov2 (COVID) with FLU/RSV by PCR  -     methylPREDNISolone (MEDROL DOSEPACK) 4 mg tablet; use as directed  Dispense: 1 each; Refill: 0  -     promethazine-dextromethorphan (PROMETHAZINE-DM) 6.25-15 mg/5 mL Syrp; Take 5 mLs by mouth every 12 (twelve) hours as needed (cough).  Dispense: 100 mL; Refill: 0    See above.    3. Nasal congestion  -     SARS-Cov2 (COVID) with FLU/RSV by PCR  -     methylPREDNISolone (MEDROL DOSEPACK) 4 mg tablet; use as directed  Dispense: 1 each; Refill: 0    4. Body aches  -     SARS-Cov2 (COVID) with FLU/RSV by PCR    5. Smoker    Not discussed at length but not dedicated to quit for now.    6. Encounter for screening for malignant neoplasm of prostate  -     PSA, SCREENING; Future; Expected date: 08/28/2024    7. Annual physical exam  -     CBC Auto Differential; Future; Expected date: 08/28/2024  -     Comprehensive Metabolic Panel; Future; Expected date: 08/28/2024  -     Hemoglobin A1C; Future; Expected date: 08/28/2024  -     TSH; Future; Expected date: 08/28/2024  -     Lipid Panel; Future; Expected date: 08/28/2024       Follow up in about 7 weeks (around 8/30/2024) for  annual after blood work.    This note was created by combination of typed  and M-Modal dictation.  Transcription errors may be present.  If there are any questions, please contact me.

## 2024-07-12 NOTE — LETTER
[address] ? [place], [address] ? Phone [phone] ? Fax [phone]           Return to Work    Patient: Ryan Rico  YOB: 1965   Date: 07/12/2024      To Whom It May Concern:     Rayn Rico was in contact seen in my office on 07/12/2024. He has been out of work form medical reasons from 07/09/2024.  COVID-19 is present in our communities across the state. Not all patients are eligible or appropriate to be tested. In this situation, your employee meets the following criteria:     Ryan Rico has met the criteria for COVID-19 testing based upon symptoms, travel, and/or potential exposure. The test has been completed and is pending results at this time. During this time the employee is not able to work and should be quarantined per the Centers for Disease Control timelines.      If you have any questions or concerns, or if I can be of further assistance, please do not hesitate to contact me.     Sincerely,    Xochitl Colby MD

## 2024-07-12 NOTE — PROGRESS NOTES
Health Maintenance Due   Topic     Hepatitis C Screening  CONSULT WITH PCP    Annual UACr  CONSULT WITH PCP    HIV Screening  CONSULT WITH PCP    TETANUS VACCINE  CONSULT WITH PCP    Colorectal Cancer Screening  CONSULT WITH PCP    Shingles Vaccine (1 of 2) CONSULT WITH PCP    RSV Vaccine (Age 60+ and Pregnant patients) (1 - 1-dose 60+ series) Not offered at this facility.    COVID-19 Vaccine (2 - 2023-24 season) Not offered at this facility.

## 2024-07-13 LAB
INFLUENZA A, MOLECULAR: NOT DETECTED
INFLUENZA B, MOLECULAR: NOT DETECTED
RSV AG BY MOLECULAR METHOD: NOT DETECTED
SARS-COV-2 RNA RESP QL NAA+PROBE: NOT DETECTED

## 2024-07-18 ENCOUNTER — OFFICE VISIT (OUTPATIENT)
Dept: FAMILY MEDICINE | Facility: CLINIC | Age: 59
End: 2024-07-18
Payer: COMMERCIAL

## 2024-07-18 VITALS
HEIGHT: 70 IN | TEMPERATURE: 99 F | SYSTOLIC BLOOD PRESSURE: 130 MMHG | RESPIRATION RATE: 18 BRPM | OXYGEN SATURATION: 95 % | HEART RATE: 99 BPM | DIASTOLIC BLOOD PRESSURE: 62 MMHG | BODY MASS INDEX: 33.49 KG/M2 | WEIGHT: 233.94 LBS

## 2024-07-18 DIAGNOSIS — R05.9 COUGH, UNSPECIFIED TYPE: ICD-10-CM

## 2024-07-18 DIAGNOSIS — Z12.12 ENCOUNTER FOR COLORECTAL CANCER SCREENING: ICD-10-CM

## 2024-07-18 DIAGNOSIS — F17.200 SMOKER: ICD-10-CM

## 2024-07-18 DIAGNOSIS — J06.9 UPPER RESPIRATORY TRACT INFECTION, UNSPECIFIED TYPE: ICD-10-CM

## 2024-07-18 DIAGNOSIS — Z12.11 ENCOUNTER FOR COLORECTAL CANCER SCREENING: ICD-10-CM

## 2024-07-18 DIAGNOSIS — R06.2 WHEEZING: Primary | ICD-10-CM

## 2024-07-18 PROCEDURE — 3075F SYST BP GE 130 - 139MM HG: CPT | Mod: CPTII,S$GLB,,

## 2024-07-18 PROCEDURE — 99999 PR PBB SHADOW E&M-EST. PATIENT-LVL V: CPT | Mod: PBBFAC,,,

## 2024-07-18 PROCEDURE — 1160F RVW MEDS BY RX/DR IN RCRD: CPT | Mod: CPTII,S$GLB,,

## 2024-07-18 PROCEDURE — 3008F BODY MASS INDEX DOCD: CPT | Mod: CPTII,S$GLB,,

## 2024-07-18 PROCEDURE — 1159F MED LIST DOCD IN RCRD: CPT | Mod: CPTII,S$GLB,,

## 2024-07-18 PROCEDURE — 3078F DIAST BP <80 MM HG: CPT | Mod: CPTII,S$GLB,,

## 2024-07-18 PROCEDURE — 99214 OFFICE O/P EST MOD 30 MIN: CPT | Mod: S$GLB,,,

## 2024-07-18 RX ORDER — ALBUTEROL SULFATE 90 UG/1
2 AEROSOL, METERED RESPIRATORY (INHALATION) EVERY 6 HOURS PRN
Qty: 18 G | Refills: 0 | Status: SHIPPED | OUTPATIENT
Start: 2024-07-18 | End: 2025-07-18

## 2024-07-18 NOTE — PROGRESS NOTES
Health Maintenance Due   Topic     Hepatitis C Screening  Consult pcp    Annual UACr  Consult pcp    HIV Screening  Consult pcp    TETANUS VACCINE  Pt decline    Colorectal Cancer Screening  Information received.      Shingles Vaccine (1 of 2) Pt decline    RSV Vaccine (Age 60+ and Pregnant patients) (1 - 1-dose 60+ series) Not offered at this Facility    COVID-19 Vaccine (2 - 2023-24 season) Not offered at this Facility

## 2024-07-18 NOTE — LETTER
July 22, 2024      [facility]  [address] [place] [address]  Phone: [phone]  Fax: [phone]       Patient: Ryan Rico   YOB: 1965  Date of Visit: 07/22/2024    To Whom It May Concern:    Zena Rico  was at [facility] on 7/18/2024. The patient may return to work/school on 7/23/2024 with no restrictions. If you have any questions or concerns, or if I can be of further assistance, please do not hesitate to contact me.    Sincerely,    Teena Cerna PA-C

## 2024-07-18 NOTE — PROGRESS NOTES
HPI     Chief Complaint:  Chief Complaint   Patient presents with    Follow-up    Back Pain     jorge Rico is a 59 y.o. male with multiple medical diagnoses as listed in the medical history and problem list that presents for  follow up and lower back pain    Follow-up  The problem has been gradually improving. Pertinent negatives include no abdominal pain, chest pain, chills, fever, myalgias, nausea, rash, sore throat or urinary symptoms. Nothing aggravates the symptoms. He has tried nothing for the symptoms.   Back Pain  The quality of the pain is described as shooting. Radiates to: across the middle of his back when getting up in the morning. Pertinent negatives include no abdominal pain, chest pain or fever. He has tried nothing for the symptoms.     Patient also works for [employer] and exposed to strong acids that he works. Patient states that he is compliant with wearing PPE.   Patient smokes 1 pack of cigarettes per day   Patient denies wanting smoking cessation   Patient has not been to work last week,   asked for a work note to excuse him.  Work his asking for him to return on Monday patient asked for a note to return on Tuesday or after is referral was done.     Assessment & Plan    Patient was provided a work note to return on Tuesday   Patient referred to pulmonology due to several frequent bouts of upper respiratory issues and continuous wheezing.  Patient is a smoker.   PFTs have been ordered but may need to be scheduled by pulmonology   Discussed with patient the risks of smoking and  harm it does to his lungs   Refill albuterol inhaler   Provided stretches on AVS for patient's localized back pain  across his thoracic.   Could be sore muscles due to respiratory issues.  Advised patient to use a heating pad and stretches   Place order for colonoscopy screening for care gaps     Information provided on AVS  Problem List Items Addressed This Visit          ENT    Upper respiratory tract  infection    Relevant Medications    albuterol (PROAIR HFA) 90 mcg/actuation inhaler    Other Relevant Orders    Complete PFT w/ bronchodilator    Ambulatory referral/consult to Pulmonology       Pulmonary    Wheezing - Primary    Relevant Medications    albuterol (PROAIR HFA) 90 mcg/actuation inhaler    Other Relevant Orders    Complete PFT w/ bronchodilator    Ambulatory referral/consult to Pulmonology    Cough    Relevant Medications    albuterol (PROAIR HFA) 90 mcg/actuation inhaler    Other Relevant Orders    Complete PFT w/ bronchodilator       GI    Encounter for colorectal cancer screening    Relevant Orders    Ambulatory referral/consult to Endo Procedure        Other    Smoker    Relevant Orders    Ambulatory referral/consult to Pulmonology         --------------------------------------------      Health Maintenance:  Health Maintenance         Date Due Completion Date    Hepatitis C Screening Never done ---    Annual UACr Never done ---    HIV Screening Never done ---    TETANUS VACCINE Never done ---    Colorectal Cancer Screening Never done ---    Shingles Vaccine (1 of 2) Never done ---    RSV Vaccine (Age 60+ and Pregnant patients) (1 - 1-dose 60+ series) Never done ---    COVID-19 Vaccine (2 - 2023-24 season) 09/01/2023 8/13/2021    Influenza Vaccine (1) 09/01/2024 9/7/2023    Hemoglobin A1c (Diabetic Prevention Screening) 08/31/2026 8/31/2023    Lipid Panel 08/31/2028 8/31/2023            Health maintenance reviewed colonoscopy screening order placed    Follow Up:  Follow up if symptoms worsen or fail to improve.    Exam     Review of Systems:  (as noted above)  Review of Systems   Constitutional:  Negative for chills and fever.   HENT:  Negative for sore throat.    Respiratory:  Negative for shortness of breath.    Cardiovascular:  Negative for chest pain.   Gastrointestinal:  Negative for abdominal pain and nausea.   Genitourinary:  Negative for difficulty urinating.   Musculoskeletal:   "Positive for back pain. Negative for myalgias.   Skin:  Negative for rash.   Neurological:  Negative for dizziness.       Physical Exam:   Physical Exam  Constitutional:       General: He is not in acute distress.     Appearance: He is not ill-appearing, toxic-appearing or diaphoretic.   HENT:      Head: Normocephalic and atraumatic.   Cardiovascular:      Rate and Rhythm: Normal rate and regular rhythm.      Pulses: Normal pulses.      Heart sounds: Normal heart sounds.   Pulmonary:      Effort: Pulmonary effort is normal. No respiratory distress.      Breath sounds: Wheezing present.      Comments:  More prominent wheezing on the right lung  Musculoskeletal:      Right lower leg: No edema.      Left lower leg: No edema.   Neurological:      Mental Status: He is alert and oriented to person, place, and time.   Psychiatric:         Mood and Affect: Mood normal.       Vitals:    07/18/24 1422   BP: 130/62   BP Location: Right arm   Patient Position: Sitting   BP Method: Small (Manual)   Pulse: 99   Resp: 18   Temp: 98.8 °F (37.1 °C)   TempSrc: Oral   SpO2: 95%   Weight: 106.1 kg (233 lb 14.5 oz)   Height: 5' 10" (1.778 m)      Body mass index is 33.56 kg/m².        History     Past Medical History:  Past Medical History:   Diagnosis Date    Wheezing 9/2/2023       Past Surgical History:  Past Surgical History:   Procedure Laterality Date    orchectom      RADICAL NEPHRECTOMY Right        Social History:  Social History     Socioeconomic History    Marital status: Single   Tobacco Use    Smoking status: Every Day     Types: Cigarettes    Smokeless tobacco: Never   Substance and Sexual Activity    Alcohol use: Yes     Comment: social   Social History Narrative    Single, will get .    Lost second wife from colon cancer.    3 kids     37    Daughter     31     Son    Son    27    [place]    One grand son 9 year.     Social Determinants of Health     Financial Resource Strain: Low Risk  (9/7/2023)    Overall " Financial Resource Strain (CARDIA)     Difficulty of Paying Living Expenses: Not hard at all   Food Insecurity: No Food Insecurity (9/7/2023)    Hunger Vital Sign     Worried About Running Out of Food in the Last Year: Never true     Ran Out of Food in the Last Year: Never true   Transportation Needs: No Transportation Needs (9/7/2023)    PRAPARE - Transportation     Lack of Transportation (Medical): No     Lack of Transportation (Non-Medical): No   Physical Activity: Insufficiently Active (9/7/2023)    Exercise Vital Sign     Days of Exercise per Week: 1 day     Minutes of Exercise per Session: 40 min   Stress: No Stress Concern Present (9/7/2023)    [employer] - Occupational Stress Questionnaire     Feeling of Stress : Not at all   Housing Stability: Low Risk  (9/7/2023)    Housing Stability Vital Sign     Unable to Pay for Housing in the Last Year: No     Number of Places Lived in the Last Year: 1     Unstable Housing in the Last Year: No       Family History:  Family History   Problem Relation Name Age of Onset    Lung cancer Mother         Allergies and Medications: (updated and reviewed)  Review of patient's allergies indicates:  No Known Allergies  Current Outpatient Medications   Medication Sig Dispense Refill    methylPREDNISolone (MEDROL DOSEPACK) 4 mg tablet use as directed 1 each 0    promethazine-dextromethorphan (PROMETHAZINE-DM) 6.25-15 mg/5 mL Syrp Take 5 mLs by mouth every 12 (twelve) hours as needed (cough). 100 mL 0    albuterol (PROAIR HFA) 90 mcg/actuation inhaler Inhale 2 puffs into the lungs every 6 (six) hours as needed for Wheezing. Rescue 18 g 0    rosuvastatin (CRESTOR) 5 MG tablet Take 1 tablet (5 mg total) by mouth once daily. Take in the evening (Patient not taking: Reported on 1/2/2024) 90 tablet 3     No current facility-administered medications for this visit.       Patient Care Team:  Xochitl Colby MD as PCP - General (Family Medicine)  ,  Gabriel COCHRAN MA as Care Coordinator         - The patient is given an After Visit Summary that lists all medications with directions, allergies, education, orders placed during this encounter and follow-up instructions.      - I have reviewed the patient's medical information including past medical, family, and social history sections including the medications and allergies.      - We discussed the patient's current medications.     This note was created by combination of typed  and MModal dictation.  Transcription errors may be present.  If there are any questions, please contact me.       Teena Cerna PA-C

## 2024-07-18 NOTE — LETTER
July 18, 2024      [facility]  [address] [place] [address]  Phone: [phone]  Fax: [phone]       Patient: Ryan Rico   YOB: 1965  Date of Visit: 07/18/2024    To Whom It May Concern:    Zena Rico  was at [facility] on 07/18/2024. The patient may return to work/school on *** {With/no:32121} restrictions. If you have any questions or concerns, or if I can be of further assistance, please do not hesitate to contact me.    Sincerely,    Teena Cerna PA-C

## 2024-09-20 ENCOUNTER — PATIENT OUTREACH (OUTPATIENT)
Dept: ADMINISTRATIVE | Facility: HOSPITAL | Age: 59
End: 2024-09-20
Payer: COMMERCIAL

## 2024-09-20 DIAGNOSIS — N18.31 STAGE 3A CHRONIC KIDNEY DISEASE: Primary | ICD-10-CM

## 2024-09-20 NOTE — PROGRESS NOTES
Population Health Chart Review & Patient Outreach Details      Additional Reading Hospital Notes:      PAYOR REPORT DUE FOR COLONOSCOPY (NEED SCHEDULE AUDIO PAT CSCOPE)--- ALSO NEED URINE SCREENING. IF ALREADY DONE, NEED TO GET RECORDS INFORMATION.  PATIENT CAN ALSO CALL endo department at [phone].  Left message for patient to return call.Sent [email or web address] message.          Updates Requested / Reviewed:      Updated Care Coordination Note and Care Everywhere         Health Maintenance Topics Overdue:      VB Score: 1     Colon Cancer Screening    Influenza Vaccine  Tetanus Vaccine  Shingles/Zoster Vaccine  RSV Vaccine                  Health Maintenance Topic(s) Outreach Outcomes & Actions Taken:    Colorectal Cancer Screening - Outreach Outcomes & Actions Taken  : Left message for patient to return call.Sent [email or web address] message.     Lab(s) - Outreach Outcomes & Actions Taken  : Left message for patient to return call.Sent [email or web address] message.

## 2024-10-30 ENCOUNTER — PATIENT MESSAGE (OUTPATIENT)
Dept: RESEARCH | Facility: HOSPITAL | Age: 59
End: 2024-10-30
Payer: COMMERCIAL

## 2024-11-05 ENCOUNTER — PATIENT MESSAGE (OUTPATIENT)
Dept: RESEARCH | Facility: HOSPITAL | Age: 59
End: 2024-11-05
Payer: COMMERCIAL

## 2025-04-04 ENCOUNTER — OFFICE VISIT (OUTPATIENT)
Dept: FAMILY MEDICINE | Facility: CLINIC | Age: 60
End: 2025-04-04
Payer: COMMERCIAL

## 2025-04-04 VITALS
OXYGEN SATURATION: 98 % | RESPIRATION RATE: 16 BRPM | TEMPERATURE: 99 F | WEIGHT: 227.75 LBS | HEART RATE: 90 BPM | HEIGHT: 70 IN | BODY MASS INDEX: 32.61 KG/M2 | SYSTOLIC BLOOD PRESSURE: 130 MMHG | DIASTOLIC BLOOD PRESSURE: 82 MMHG

## 2025-04-04 DIAGNOSIS — Z12.12 ENCOUNTER FOR COLORECTAL CANCER SCREENING: ICD-10-CM

## 2025-04-04 DIAGNOSIS — J06.9 UPPER RESPIRATORY TRACT INFECTION, UNSPECIFIED TYPE: ICD-10-CM

## 2025-04-04 DIAGNOSIS — H61.23 EXCESSIVE CERUMEN IN BOTH EAR CANALS: ICD-10-CM

## 2025-04-04 DIAGNOSIS — E66.811 CLASS 1 OBESITY DUE TO EXCESS CALORIES WITHOUT SERIOUS COMORBIDITY WITH BODY MASS INDEX (BMI) OF 32.0 TO 32.9 IN ADULT: ICD-10-CM

## 2025-04-04 DIAGNOSIS — N18.31 STAGE 3A CHRONIC KIDNEY DISEASE: ICD-10-CM

## 2025-04-04 DIAGNOSIS — Z09 FOLLOW-UP EXAM: Primary | ICD-10-CM

## 2025-04-04 DIAGNOSIS — Z12.11 ENCOUNTER FOR COLORECTAL CANCER SCREENING: ICD-10-CM

## 2025-04-04 DIAGNOSIS — E66.09 CLASS 1 OBESITY DUE TO EXCESS CALORIES WITHOUT SERIOUS COMORBIDITY WITH BODY MASS INDEX (BMI) OF 32.0 TO 32.9 IN ADULT: ICD-10-CM

## 2025-04-04 PROBLEM — Z00.00 ANNUAL PHYSICAL EXAM: Status: ACTIVE | Noted: 2025-04-04

## 2025-04-04 PROCEDURE — 99999 PR PBB SHADOW E&M-EST. PATIENT-LVL III: CPT | Mod: PBBFAC,,,

## 2025-04-04 RX ORDER — METHYLPREDNISOLONE 4 MG/1
TABLET ORAL
Qty: 21 EACH | Refills: 0 | Status: SHIPPED | OUTPATIENT
Start: 2025-04-04

## 2025-04-04 NOTE — PROGRESS NOTES
Health Maintenance Due   Topic     Hepatitis C Screening  Consult with PCP    Annual UACr  Consult with PCP    HIV Screening  Consult with PCP    TETANUS VACCINE      Colorectal Cancer Screening  Consult with PCP    Shingles Vaccine (1 of 2) hx chickenpox ; inform pt can get vaccine at pharmacy.    Influenza Vaccine (1) Out of vaccine    COVID-19 Vaccine (2 - 2024-25 season)

## 2025-04-04 NOTE — LETTER
April 4, 2025      [facility]  [address] [place] [address]  Phone: [phone]  Fax: [phone]       Patient: Ryan Rico   YOB: 1965  Date of Visit: 04/04/2025    To Whom It May Concern:    Zena Rico  was at [facility] on 04/04/2025. The patient may return to work/school on 4/7/2025 with no restrictions. If you have any questions or concerns, or if I can be of further assistance, please do not hesitate to contact me.    Sincerely,    Teena Cerna PA-C

## 2025-04-04 NOTE — PROGRESS NOTES
HPI     Chief Complaint:  Chief Complaint   Patient presents with    Follow-up     Return to work       Ryan Rico is a 59 y.o. male with multiple medical diagnoses as listed in the medical history and problem list that presents for Follow-up and work note    HPI    History of Present Illness    CHIEF COMPLAINT:  Ryan presents today for return to work clearance following illness    CURRENT SYMPTOMS:  He believes he had influenza, though unconfirmed. He currently experiences mild congestion and minimal cough. His right ear becomes clogged when lying down, which improves upon sitting upright. He has been self-treating symptoms with OTC DayQuil and NyQuil.    MEDICATIONS:  He has an albuterol inhaler but is not using it due to concerns about improper technique.  Patient was told he had lots of ear wax in his ears.  Asked for ears to be examined.      ROS:  General: -fever, -chills, -fatigue, -weight gain, -weight loss  Eyes: -vision changes, -redness, -discharge  ENT: -ear pain, +nasal congestion, -sore throat, +ear pressure  Cardiovascular: -chest pain, -palpitations, -lower extremity edema  Respiratory: +cough, -shortness of breath  Gastrointestinal: -abdominal pain, -nausea, -vomiting, -diarrhea, -constipation, -blood in stool  Genitourinary: -dysuria, -hematuria, -frequency  Musculoskeletal: -joint pain, -muscle pain  Skin: -rash, -lesion  Neurological: -headache, -dizziness, -numbness, -tingling  Psychiatric: -anxiety, -depression, -sleep difficulty             Assessment & Plan     Assessment & Plan    Assessed readiness to return to work following suspected flu-like illness.  Evaluated lingering congestion symptoms and auscultated lungs, noting possible airway inflammation or fluid retention.        Problem List Items Addressed This Visit       Class 1 obesity due to excess calories without serious comorbidity with body mass index (BMI) of 32.0 to 32.9 in adult  Stable.  Patient is just getting over a  viral infection.  Will discuss at a later date    Stage 3a chronic kidney disease  Last chemistry was taken in 2023.  Patient is to follow up with labs during an annual  Continue to take blood pressure medication as prescribed   Keep blood pressure within normal limits   Drank up to 80 oz of water per day   Continue to monitor kidney function by labs      Encounter for colorectal cancer screening    Relevant Orders    Cologuard Screening (Multitarget Stool DNA)  Care gaps screening for colorectal screening      Upper respiratory tract infection    Relevant Medications    methylPREDNISolone (MEDROL DOSEPACK) 4 mg tablet  Upper respiratory tract infection:  - Monitored the patient's flu-like symptoms and work absence.  - Ryan reports feeling a little congested but improved from last week.  - Auscultated the patient's lungs and detected sounds indicating possible airway swelling or fluid.  - Assessed the need for medication to reduce inflammation and congestion.  - Prescribed a MedDraw pack (steroid) for 6 days with declining dosage each day to address residual respiratory symptoms and reduce airway inflammation.  - Advised the patient to use Vicks VapoRub or take hot showers to help with sinus congestion.  - Reviewed and explained proper albuterol inhaler use and technique: keeping throat and airway open, pressing pump and inhaling simultaneously.  - Instructed to continue albuterol as needed for congestion.  - Offered albuterol inhaler refill if needed.  - Recommend discontinuing OTC DayQuil and NightQuil after current supply is finished.    Follow-up exam - Primary  Schedule an annual and establish care appointment with new PCP in office     Other Visit Diagnoses         Excessive cerumen in both ear canals      IMPACTED CERUMEN IN RIGHT EAR:  - Monitored the patient's report of ear clogging when lying down, which improves when sitting up.  - Examined both ears and found significant cerumen buildup, especially in  "the right ear.  - The right ear canal was not visible due to cerumen.  - Assessed the cerumen buildup and advised on home treatment to prevent impaction.  - Recommend at-home cerumen removal using OTC kit before considering referral to ENT if impaction occurs.  - Instructed on use of the OTC kit in shower for convenience.  - Explained the process of using drops to soften cerumen and irrigation to remove it.               --------------------------------------------      Health Maintenance:  Health Maintenance         Date Due Completion Date    Hepatitis C Screening Never done ---    Annual UACr Never done ---    HIV Screening Never done ---    TETANUS VACCINE Never done ---    Colorectal Cancer Screening Never done ---    Shingles Vaccine (1 of 2) Never done ---    Influenza Vaccine (1) 09/01/2024 9/7/2023    COVID-19 Vaccine (2 - 2024-25 season) 09/01/2024 8/13/2021    Hemoglobin A1c (Diabetic Prevention Screening) 08/31/2026 8/31/2023    Lipid Panel 08/31/2028 8/31/2023    RSV Vaccine (Age 60+ and Pregnant patients) (1 - 1-dose 75+ series) 06/19/2038 ---            Health maintenance reviewed and Advised patient on the importance of completing overdue health maintenance items    Follow Up:  Follow up if symptoms worsen or fail to improve.    Exam     Review of Systems:  (as noted above)  Review of Systems    Physical Exam:   Physical Exam  Vitals:    04/04/25 0748   BP: 130/82   BP Location: Right arm   Patient Position: Sitting   Pulse: 90   Resp: 16   Temp: 98.9 °F (37.2 °C)   TempSrc: Oral   SpO2: 98%   Weight: 103.3 kg (227 lb 11.8 oz)   Height: 5' 10" (1.778 m)      Body mass index is 32.68 kg/m².    Physical Exam    Vitals: Blood pressure looks good.  General: No acute distress. Well-developed. Well-nourished.  Eyes: EOMI. Sclerae anicteric.  HENT: Normocephalic. Atraumatic. Nares patent.   Cardiovascular: Regular rate. Regular rhythm. No murmurs. No rubs. No gallops. Normal S1, S2.  Respiratory: Normal " respiratory effort. Clear to auscultation bilaterally. No rales. No rhonchi. No wheezing.  Musculoskeletal: No  obvious deformity.  Extremities: No lower extremity edema.  Neurological: Alert & oriented x3. No slurred speech. Normal gait.  Psychiatric: Normal mood. Normal affect. Good insight. Good judgment.  Skin: Warm. Dry. No rash.           History     Past Medical History:  Past Medical History:   Diagnosis Date    Wheezing 9/2/2023       Past Surgical History:  Past Surgical History:   Procedure Laterality Date    orchectom      RADICAL NEPHRECTOMY Right        Social History:  Social History[1]    Family History:  Family History   Problem Relation Name Age of Onset    Lung cancer Mother         Allergies and Medications: (updated and reviewed)  Review of patient's allergies indicates:  No Known Allergies  Current Medications[2]    No care team member to display         - The patient is given an After Visit Summary that lists all medications with directions, allergies, education, orders placed during this encounter and follow-up instructions.      - I have reviewed the patient's medical information including past medical, family, and social history sections including the medications and allergies.      - We discussed the patient's current medications.     This note was created by combination of typed  and MModal dictation.  Transcription errors may be present.  If there are any questions, please contact me.     This note was generated with the assistance of ambient listening technology. Verbal consent was obtained by the patient and accompanying visitor(s) for the recording of patient appointment to facilitate this note. I attest to having reviewed and edited the generated note for accuracy, though some syntax or spelling errors may persist. Please contact the author of this note for any clarification.          Teena Cerna PA-C                      [1]   Social History  Socioeconomic  History    Marital status: Single   Tobacco Use    Smoking status: Every Day     Types: Cigarettes    Smokeless tobacco: Never   Substance and Sexual Activity    Alcohol use: Yes     Comment: social   Social History Narrative    Single, will get .    Lost second wife from colon cancer.    3 kids     37    Daughter     31     Son    Son    27    [place]    One grand son 9 year.     Social Drivers of Health     Financial Resource Strain: Low Risk  (9/7/2023)    Overall Financial Resource Strain (CARDIA)     Difficulty of Paying Living Expenses: Not hard at all   Food Insecurity: No Food Insecurity (9/7/2023)    Hunger Vital Sign     Worried About Running Out of Food in the Last Year: Never true     Ran Out of Food in the Last Year: Never true   Transportation Needs: No Transportation Needs (9/7/2023)    PRAPARE - Transportation     Lack of Transportation (Medical): No     Lack of Transportation (Non-Medical): No   Physical Activity: Insufficiently Active (9/7/2023)    Exercise Vital Sign     Days of Exercise per Week: 1 day     Minutes of Exercise per Session: 40 min   Stress: No Stress Concern Present (9/7/2023)    [employer] [place] [employer] [employer] - Occupational Stress Questionnaire     Feeling of Stress : Not at all   Housing Stability: Low Risk  (9/7/2023)    Housing Stability Vital Sign     Unable to Pay for Housing in the Last Year: No     Number of Places Lived in the Last Year: 1     Unstable Housing in the Last Year: No   [2]   Current Outpatient Medications   Medication Sig Dispense Refill    albuterol (PROAIR HFA) 90 mcg/actuation inhaler Inhale 2 puffs into the lungs every 6 (six) hours as needed for Wheezing. Rescue 18 g 0    methylPREDNISolone (MEDROL DOSEPACK) 4 mg tablet use as directed 21 each 0     No current facility-administered medications for this visit.

## 2025-04-22 ENCOUNTER — RESULTS FOLLOW-UP (OUTPATIENT)
Dept: FAMILY MEDICINE | Facility: CLINIC | Age: 60
End: 2025-04-22

## 2025-05-28 ENCOUNTER — TELEPHONE (OUTPATIENT)
Dept: FAMILY MEDICINE | Facility: CLINIC | Age: 60
End: 2025-05-28

## 2025-05-28 ENCOUNTER — CLINICAL SUPPORT (OUTPATIENT)
Dept: FAMILY MEDICINE | Facility: CLINIC | Age: 60
End: 2025-05-28
Payer: COMMERCIAL

## 2025-05-28 VITALS — DIASTOLIC BLOOD PRESSURE: 86 MMHG | SYSTOLIC BLOOD PRESSURE: 122 MMHG

## 2025-05-28 DIAGNOSIS — Z01.30 BLOOD PRESSURE CHECK: Primary | ICD-10-CM

## 2025-05-28 PROCEDURE — 99999 PR PBB SHADOW E&M-EST. PATIENT-LVL I: CPT | Mod: PBBFAC,,,

## 2025-05-28 NOTE — TELEPHONE ENCOUNTER
----- Message from Teena Cerna PA-C sent at 5/28/2025 11:59 AM CDT -----  Please have this patient follow up with Dr. Mcintosh to establish care.  He no showed for his last visit.  Thank you  ----- Message -----  From: Alina Estrada LPN  Sent: 5/28/2025  10:19 AM CDT  To: Teena Cerna PA-C

## 2025-05-28 NOTE — TELEPHONE ENCOUNTER
Left message informing patient of INGA Sandhu's message requesting a visit with Dr. Mcintosh to establish care.

## 2025-05-28 NOTE — PROGRESS NOTES
Ryan Rico 59 y.o. male is here today for Blood Pressure check.   History of HTN no.    Review of patient's allergies indicates:  No Known Allergies  Creatinine   Date Value Ref Range Status   08/31/2023 1.6 (H) 0.5 - 1.4 mg/dL Final     Sodium   Date Value Ref Range Status   08/31/2023 142 136 - 145 mmol/L Final     Potassium   Date Value Ref Range Status   08/31/2023 4.3 3.5 - 5.1 mmol/L Final   ]  Patient denies taking blood pressure medications on a regular basis at the same time of the day. No blood pressure medication prescribed  Current Medications[1]  Does patient have record of home blood pressure readings no. Readings have been averaging  - no readings.   Last dose of blood pressure medication was taken at - no blood pressure medication prescribed.  Patient is asymptomatic.   Complains of - headaches, for past 3 nights.    Patient stated that he experiences a headache when he is going to bed, after having smoked a cigar while relaxing.  Advised patient that the nicotine in the cigar could be causing his blood pressure to increase which may in turn be causing the headache.  Patient stated he will monitor the possible association more closely in the future.  Stated he places a towel with rubbing alcohol and ice on the back of his neck to manage the headache while sleeping.  Stated the headache is generally gone when he awakes.  Please be advised    122/86  left arm, sitting, marky Sandhu informed of nurse visit.   .           [1]   Current Outpatient Medications:     albuterol (PROAIR HFA) 90 mcg/actuation inhaler, Inhale 2 puffs into the lungs every 6 (six) hours as needed for Wheezing. Rescue, Disp: 18 g, Rfl: 0    methylPREDNISolone (MEDROL DOSEPACK) 4 mg tablet, use as directed, Disp: 21 each, Rfl: 0